# Patient Record
Sex: MALE | Race: WHITE | URBAN - METROPOLITAN AREA
[De-identification: names, ages, dates, MRNs, and addresses within clinical notes are randomized per-mention and may not be internally consistent; named-entity substitution may affect disease eponyms.]

---

## 2017-01-11 PROCEDURE — 93224 XTRNL ECG REC UP TO 48 HRS: CPT | Performed by: INTERNAL MEDICINE

## 2017-03-06 ENCOUNTER — OFFICE VISIT (OUTPATIENT)
Dept: CARDIOLOGY CLINIC | Age: 62
End: 2017-03-06

## 2017-03-06 VITALS
BODY MASS INDEX: 35.23 KG/M2 | SYSTOLIC BLOOD PRESSURE: 110 MMHG | DIASTOLIC BLOOD PRESSURE: 60 MMHG | HEART RATE: 84 BPM | WEIGHT: 274.4 LBS

## 2017-03-06 DIAGNOSIS — E66.9 OBESITY (BMI 30.0-34.9): ICD-10-CM

## 2017-03-06 DIAGNOSIS — F17.210 SMOKING GREATER THAN 30 PACK YEARS: ICD-10-CM

## 2017-03-06 DIAGNOSIS — I10 ESSENTIAL HYPERTENSION: Primary | ICD-10-CM

## 2017-03-06 DIAGNOSIS — E11.628: ICD-10-CM

## 2017-03-06 DIAGNOSIS — E78.00 PURE HYPERCHOLESTEROLEMIA: ICD-10-CM

## 2017-03-06 PROCEDURE — 99214 OFFICE O/P EST MOD 30 MIN: CPT | Performed by: NURSE PRACTITIONER

## 2017-09-11 ENCOUNTER — OFFICE VISIT (OUTPATIENT)
Dept: CARDIOLOGY CLINIC | Age: 62
End: 2017-09-11

## 2017-09-11 VITALS
DIASTOLIC BLOOD PRESSURE: 74 MMHG | HEART RATE: 80 BPM | BODY MASS INDEX: 32.48 KG/M2 | WEIGHT: 253 LBS | SYSTOLIC BLOOD PRESSURE: 130 MMHG

## 2017-09-11 DIAGNOSIS — F17.210 SMOKING GREATER THAN 30 PACK YEARS: ICD-10-CM

## 2017-09-11 DIAGNOSIS — E11.628: ICD-10-CM

## 2017-09-11 DIAGNOSIS — I10 ESSENTIAL HYPERTENSION: Primary | ICD-10-CM

## 2017-09-11 PROCEDURE — 99214 OFFICE O/P EST MOD 30 MIN: CPT | Performed by: NURSE PRACTITIONER

## 2018-07-31 ENCOUNTER — OFFICE VISIT (OUTPATIENT)
Dept: CARDIOLOGY CLINIC | Age: 63
End: 2018-07-31

## 2018-07-31 VITALS
HEART RATE: 74 BPM | WEIGHT: 243.8 LBS | SYSTOLIC BLOOD PRESSURE: 110 MMHG | DIASTOLIC BLOOD PRESSURE: 66 MMHG | BODY MASS INDEX: 31.3 KG/M2

## 2018-07-31 DIAGNOSIS — Z98.890 S/P CORONARY ANGIOGRAM: ICD-10-CM

## 2018-07-31 DIAGNOSIS — I10 ESSENTIAL HYPERTENSION: Primary | ICD-10-CM

## 2018-07-31 PROCEDURE — G8427 DOCREV CUR MEDS BY ELIG CLIN: HCPCS | Performed by: INTERNAL MEDICINE

## 2018-07-31 PROCEDURE — G8417 CALC BMI ABV UP PARAM F/U: HCPCS | Performed by: INTERNAL MEDICINE

## 2018-07-31 PROCEDURE — 4004F PT TOBACCO SCREEN RCVD TLK: CPT | Performed by: INTERNAL MEDICINE

## 2018-07-31 PROCEDURE — 99214 OFFICE O/P EST MOD 30 MIN: CPT | Performed by: INTERNAL MEDICINE

## 2018-07-31 PROCEDURE — 3017F COLORECTAL CA SCREEN DOC REV: CPT | Performed by: INTERNAL MEDICINE
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
## 2019-06-03 ENCOUNTER — OFFICE VISIT (OUTPATIENT)
Dept: CARDIOLOGY CLINIC | Age: 64
End: 2019-06-03
Payer: MEDICAID

## 2019-06-03 VITALS
SYSTOLIC BLOOD PRESSURE: 145 MMHG | HEART RATE: 72 BPM | BODY MASS INDEX: 33.48 KG/M2 | WEIGHT: 260.8 LBS | DIASTOLIC BLOOD PRESSURE: 80 MMHG

## 2019-06-03 DIAGNOSIS — I10 ESSENTIAL HYPERTENSION: Primary | ICD-10-CM

## 2019-06-03 DIAGNOSIS — I10 ESSENTIAL HYPERTENSION: ICD-10-CM

## 2019-06-03 DIAGNOSIS — E78.5 HYPERLIPIDEMIA, UNSPECIFIED HYPERLIPIDEMIA TYPE: ICD-10-CM

## 2019-06-03 DIAGNOSIS — R07.9 CHEST PAIN, UNSPECIFIED TYPE: ICD-10-CM

## 2019-06-03 DIAGNOSIS — R07.9 CHEST PAIN, UNSPECIFIED TYPE: Primary | ICD-10-CM

## 2019-06-03 DIAGNOSIS — R06.02 SOB (SHORTNESS OF BREATH): ICD-10-CM

## 2019-06-03 LAB
A/G RATIO: 1.9 (ref 1.1–2.2)
ALBUMIN SERPL-MCNC: 4.5 G/DL (ref 3.4–5)
ALP BLD-CCNC: 81 U/L (ref 40–129)
ALT SERPL-CCNC: 13 U/L (ref 10–40)
ANION GAP SERPL CALCULATED.3IONS-SCNC: 14 MMOL/L (ref 3–16)
AST SERPL-CCNC: 12 U/L (ref 15–37)
BILIRUB SERPL-MCNC: 0.3 MG/DL (ref 0–1)
BUN BLDV-MCNC: 16 MG/DL (ref 7–20)
CALCIUM SERPL-MCNC: 9.2 MG/DL (ref 8.3–10.6)
CHLORIDE BLD-SCNC: 109 MMOL/L (ref 99–110)
CHOLESTEROL, TOTAL: 101 MG/DL (ref 0–199)
CO2: 23 MMOL/L (ref 21–32)
CREAT SERPL-MCNC: 0.8 MG/DL (ref 0.8–1.3)
GFR AFRICAN AMERICAN: >60
GFR NON-AFRICAN AMERICAN: >60
GLOBULIN: 2.4 G/DL
GLUCOSE BLD-MCNC: 92 MG/DL (ref 70–99)
HCT VFR BLD CALC: 40.7 % (ref 40.5–52.5)
HDLC SERPL-MCNC: 29 MG/DL (ref 40–60)
HEMOGLOBIN: 13.5 G/DL (ref 13.5–17.5)
LDL CHOLESTEROL CALCULATED: 34 MG/DL
MCH RBC QN AUTO: 30.3 PG (ref 26–34)
MCHC RBC AUTO-ENTMCNC: 33.2 G/DL (ref 31–36)
MCV RBC AUTO: 91.3 FL (ref 80–100)
PDW BLD-RTO: 15.2 % (ref 12.4–15.4)
PLATELET # BLD: 213 K/UL (ref 135–450)
PMV BLD AUTO: 9.2 FL (ref 5–10.5)
POTASSIUM SERPL-SCNC: 4.3 MMOL/L (ref 3.5–5.1)
RBC # BLD: 4.46 M/UL (ref 4.2–5.9)
SODIUM BLD-SCNC: 146 MMOL/L (ref 136–145)
TOTAL PROTEIN: 6.9 G/DL (ref 6.4–8.2)
TRIGL SERPL-MCNC: 190 MG/DL (ref 0–150)
VLDLC SERPL CALC-MCNC: 38 MG/DL
WBC # BLD: 11.2 K/UL (ref 4–11)

## 2019-06-03 PROCEDURE — G8427 DOCREV CUR MEDS BY ELIG CLIN: HCPCS | Performed by: NURSE PRACTITIONER

## 2019-06-03 PROCEDURE — G8417 CALC BMI ABV UP PARAM F/U: HCPCS | Performed by: NURSE PRACTITIONER

## 2019-06-03 PROCEDURE — 4004F PT TOBACCO SCREEN RCVD TLK: CPT | Performed by: NURSE PRACTITIONER

## 2019-06-03 PROCEDURE — 99214 OFFICE O/P EST MOD 30 MIN: CPT | Performed by: NURSE PRACTITIONER

## 2019-06-03 PROCEDURE — 3017F COLORECTAL CA SCREEN DOC REV: CPT | Performed by: NURSE PRACTITIONER

## 2019-06-03 PROCEDURE — 93000 ELECTROCARDIOGRAM COMPLETE: CPT | Performed by: NURSE PRACTITIONER

## 2019-06-03 RX ORDER — FUROSEMIDE 40 MG/1
40 TABLET ORAL DAILY
Qty: 30 TABLET | Refills: 5 | Status: SHIPPED | OUTPATIENT
Start: 2019-06-03 | End: 2021-09-15
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
## 2019-06-14 ENCOUNTER — HOSPITAL ENCOUNTER (OUTPATIENT)
Dept: NON INVASIVE DIAGNOSTICS | Age: 64
Discharge: HOME | End: 2019-06-14
Payer: MEDICAID

## 2019-06-14 LAB
LV EF: 60 %
LV EF: 61 %
LVEF MODALITY: NORMAL
LVEF MODALITY: NORMAL

## 2019-06-14 PROCEDURE — 3430000000 HC RX DIAGNOSTIC RADIOPHARMACEUTICAL: Performed by: NURSE PRACTITIONER

## 2019-06-14 PROCEDURE — 93306 TTE W/DOPPLER COMPLETE: CPT

## 2019-06-14 PROCEDURE — 78452 HT MUSCLE IMAGE SPECT MULT: CPT

## 2019-06-14 PROCEDURE — A9502 TC99M TETROFOSMIN: HCPCS | Performed by: NURSE PRACTITIONER

## 2019-06-14 PROCEDURE — 93017 CV STRESS TEST TRACING ONLY: CPT

## 2019-06-14 RX ADMIN — TETROFOSMIN 10 MILLICURIE: 1.38 INJECTION, POWDER, LYOPHILIZED, FOR SOLUTION INTRAVENOUS at 11:07

## 2019-06-14 RX ADMIN — TETROFOSMIN 30 MILLICURIE: 1.38 INJECTION, POWDER, LYOPHILIZED, FOR SOLUTION INTRAVENOUS at 12:21

## 2019-06-26 ENCOUNTER — OFFICE VISIT (OUTPATIENT)
Dept: CARDIOLOGY CLINIC | Age: 64
End: 2019-06-26
Payer: MEDICAID

## 2019-06-26 VITALS
SYSTOLIC BLOOD PRESSURE: 107 MMHG | WEIGHT: 259.6 LBS | DIASTOLIC BLOOD PRESSURE: 60 MMHG | HEART RATE: 64 BPM | BODY MASS INDEX: 33.33 KG/M2

## 2019-06-26 DIAGNOSIS — I25.10 CORONARY ARTERY DISEASE INVOLVING NATIVE CORONARY ARTERY OF NATIVE HEART WITHOUT ANGINA PECTORIS: Primary | ICD-10-CM

## 2019-06-26 PROCEDURE — G8417 CALC BMI ABV UP PARAM F/U: HCPCS | Performed by: NURSE PRACTITIONER

## 2019-06-26 PROCEDURE — G8599 NO ASA/ANTIPLAT THER USE RNG: HCPCS | Performed by: NURSE PRACTITIONER

## 2019-06-26 PROCEDURE — G8428 CUR MEDS NOT DOCUMENT: HCPCS | Performed by: NURSE PRACTITIONER

## 2019-06-26 PROCEDURE — 3017F COLORECTAL CA SCREEN DOC REV: CPT | Performed by: NURSE PRACTITIONER

## 2019-06-26 PROCEDURE — 4004F PT TOBACCO SCREEN RCVD TLK: CPT | Performed by: NURSE PRACTITIONER

## 2019-06-26 PROCEDURE — 99214 OFFICE O/P EST MOD 30 MIN: CPT | Performed by: NURSE PRACTITIONER
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
## 2019-07-01 ENCOUNTER — TELEPHONE (OUTPATIENT)
Dept: CARDIOLOGY CLINIC | Age: 64
End: 2019-07-01

## 2019-12-16 ENCOUNTER — OFFICE VISIT (OUTPATIENT)
Dept: CARDIOLOGY CLINIC | Age: 64
End: 2019-12-16
Payer: MEDICAID

## 2019-12-16 VITALS
WEIGHT: 271 LBS | SYSTOLIC BLOOD PRESSURE: 128 MMHG | BODY MASS INDEX: 34.79 KG/M2 | HEART RATE: 76 BPM | DIASTOLIC BLOOD PRESSURE: 68 MMHG

## 2019-12-16 DIAGNOSIS — I10 ESSENTIAL HYPERTENSION: ICD-10-CM

## 2019-12-16 DIAGNOSIS — E78.00 PURE HYPERCHOLESTEROLEMIA: ICD-10-CM

## 2019-12-16 DIAGNOSIS — E66.9 OBESITY (BMI 30.0-34.9): Primary | ICD-10-CM

## 2019-12-16 PROCEDURE — G8599 NO ASA/ANTIPLAT THER USE RNG: HCPCS | Performed by: NURSE PRACTITIONER

## 2019-12-16 PROCEDURE — G8417 CALC BMI ABV UP PARAM F/U: HCPCS | Performed by: NURSE PRACTITIONER

## 2019-12-16 PROCEDURE — 4004F PT TOBACCO SCREEN RCVD TLK: CPT | Performed by: NURSE PRACTITIONER

## 2019-12-16 PROCEDURE — 99214 OFFICE O/P EST MOD 30 MIN: CPT | Performed by: NURSE PRACTITIONER

## 2019-12-16 PROCEDURE — G8428 CUR MEDS NOT DOCUMENT: HCPCS | Performed by: NURSE PRACTITIONER

## 2019-12-16 PROCEDURE — 3017F COLORECTAL CA SCREEN DOC REV: CPT | Performed by: NURSE PRACTITIONER

## 2019-12-16 PROCEDURE — G8484 FLU IMMUNIZE NO ADMIN: HCPCS | Performed by: NURSE PRACTITIONER

## 2020-06-09 ENCOUNTER — OFFICE VISIT (OUTPATIENT)
Dept: CARDIOLOGY CLINIC | Age: 65
End: 2020-06-09
Payer: MEDICAID

## 2020-06-09 VITALS
DIASTOLIC BLOOD PRESSURE: 74 MMHG | WEIGHT: 272.8 LBS | TEMPERATURE: 97.1 F | HEART RATE: 68 BPM | SYSTOLIC BLOOD PRESSURE: 130 MMHG | BODY MASS INDEX: 35.03 KG/M2

## 2020-06-09 PROCEDURE — 99214 OFFICE O/P EST MOD 30 MIN: CPT | Performed by: NURSE PRACTITIONER

## 2020-06-09 PROCEDURE — G8417 CALC BMI ABV UP PARAM F/U: HCPCS | Performed by: NURSE PRACTITIONER

## 2020-06-09 PROCEDURE — 3017F COLORECTAL CA SCREEN DOC REV: CPT | Performed by: NURSE PRACTITIONER

## 2020-06-09 PROCEDURE — 4004F PT TOBACCO SCREEN RCVD TLK: CPT | Performed by: NURSE PRACTITIONER

## 2020-06-09 PROCEDURE — G8427 DOCREV CUR MEDS BY ELIG CLIN: HCPCS | Performed by: NURSE PRACTITIONER

## 2020-06-09 PROCEDURE — 3046F HEMOGLOBIN A1C LEVEL >9.0%: CPT | Performed by: NURSE PRACTITIONER

## 2020-06-09 PROCEDURE — 2022F DILAT RTA XM EVC RTNOPTHY: CPT | Performed by: NURSE PRACTITIONER

## 2020-06-09 NOTE — PATIENT INSTRUCTIONS
Today he c/o occas chest pain / no radiation no SOB wt stable VSS   No c/o today of cp/SOB  He is a very poor historian  / he is a smoker with DMT2   GXT nuc and blood work   YARI study   Fu I 3-4 weeks

## 2020-06-09 NOTE — PROGRESS NOTES
 Hypertension     Migraine      Past Surgical History:   Procedure Laterality Date    ARTHRODESIS Left 9-17-14    Arthrodesis 4 th proximal interphalangeal joint left foot    BLADDER SURGERY      EYE SURGERY      right eye-skin cancer removed    KNEE ARTHROSCOPY Right     x4     No family history on file. Social History     Tobacco Use    Smoking status: Current Every Day Smoker     Packs/day: 1.50     Years: 40.00     Pack years: 60.00     Types: Cigarettes    Smokeless tobacco: Never Used   Substance Use Topics    Alcohol use: Yes     Alcohol/week: 0.0 standard drinks     Comment: occas    Drug use: No       Allergies   Allergen Reactions    Codeine Nausea Only     Current Outpatient Medications   Medication Sig Dispense Refill    furosemide (LASIX) 40 MG tablet Take 1 tablet by mouth daily 30 tablet 5    Omega-3 Fatty Acids (FISH OIL) 1200 MG CAPS Take by mouth      hydrocortisone (ANUSOL-HC) 25 MG suppository Place 1 suppository rectally 2 times daily 10 suppository 0    methocarbamol (ROBAXIN) 750 MG tablet Take 750 mg by mouth      folic acid-pyridoxine-cyanocobalamine (FOLTX) 2.5-25-1 MG TABS tablet Take 1 tablet by mouth daily      sitaGLIPtin (JANUVIA) 100 MG tablet Take 100 mg by mouth daily      metFORMIN (GLUCOPHAGE) 1000 MG tablet Take 1,000 mg by mouth daily (with breakfast)      insulin glargine (LANTUS) 100 UNIT/ML injection vial Inject 60 Units into the skin 2 times daily.  DULoxetine (CYMBALTA) 30 MG capsule Take 30 mg by mouth daily.  carvedilol (COREG) 6.25 MG tablet Take 6.25 mg by mouth 2 times daily (with meals).  insulin aspart (NOVOLOG) 100 UNIT/ML injection vial Inject  into the skin 3 times daily (before meals). Sliding scale      naproxen (NAPROSYN) 500 MG tablet Take 500 mg by mouth 2 times daily (with meals). Instructed to stop for surgery      simvastatin (ZOCOR) 40 MG tablet Take 40 mg by mouth nightly.       lisinopril (PRINIVIL;ZESTRIL) 20 MG tablet Take 20 mg by mouth daily.  amLODIPine (NORVASC) 5 MG tablet Take 5 mg by mouth daily.  hydrALAZINE (APRESOLINE) 25 MG tablet Take 25 mg by mouth 2 times daily.  fenofibrate 160 MG tablet Take 160 mg by mouth daily.  Multiple Vitamins-Minerals (MULTIVITAMIN PO) Take 1 capsule by mouth daily. taylor-instructed to stop for surgery-       No current facility-administered medications for this visit. Physical Exam:   /74   Pulse 68   Temp 97.1 °F (36.2 °C)   Wt 272 lb 12.8 oz (123.7 kg)   BMI 35.03 kg/m²   BP Readings from Last 3 Encounters:   06/09/20 130/74   12/16/19 128/68   06/26/19 107/60     Pulse Readings from Last 3 Encounters:   06/09/20 68   12/16/19 76   06/26/19 64     Wt Readings from Last 3 Encounters:   06/09/20 272 lb 12.8 oz (123.7 kg)   12/16/19 271 lb (122.9 kg)   06/26/19 259 lb 9.6 oz (117.8 kg)     Constitutional: He is oriented to person, place, and time. He appears well-developed and well-nourished. In no acute distress. HEENT: Normocephalic and atraumatic. Sclerae anicteric. No xanthelasmas. Conjunctiva white, no subconjunctival hemorrhage   External inspection of ears nose teeth & gums   Eyes:PERRLA EOM's intact. Neck: Neck supple. No JVD present. Carotids without bruits. No mass and no thyromegaly present. No lymphadenopathy present. Cardiovascular: RRR, normal S1 and S2; no murmur/gallop or rub, PMI nondisplaced  Pulmonary/Chest: Effort normal.  Lungs clear to auscultation. Chest wall nontender  Abdominal: soft, nontender, nondistended. + bowel sounds; no organomegaly or bruits. Aorta normal  Extremities: No edema, cyanosis, or clubbing. Pulses are 2+ radial/carotid/dorsalis pedis bilaterally. Cap refill brisk. Neurological: No cranial nerve deficit. Psychiatric: He has a normal mood and affect.  His speech is normal and behavior is normal.     Lab Review:   Lab Results   Component Value Date    TRIG 190 06/03/2019    HDL 29 06/03/2019 LDLCALC 34 2019    LABVLDL 38 2019     Lab Results   Component Value Date     2019    K 4.3 2019     2019    CO2 23 2019    BUN 16 2019    CREATININE 0.8 2019    GLUCOSE 92 2019    CALCIUM 9.2 2019      Lab Results   Component Value Date    WBC 11.2 (H) 2019    HGB 13.5 2019    HCT 40.7 2019    MCV 91.3 2019     2019     I have reviewed any available labs, images, any stress test, C on this pt         Assessment:    HTN  2019 echo wnl   2019 neg stress test    2014 [facility] No evidence of hemodynamically significant coronary artery disease  Discussed smoking cessation    HFpEF   On lasix 40mg daily with HFpEF /wt stable no c/o PND/YARI   States eats lots of sodium / discussed     HLD  LDL 34 / optimal     DMT2  Glucose level wnl      tobacco use  cessation disucssed  States smokes  3ppd for 17 years     palpitations    denies any at [facility] time    YARI?   Recommend study    Anxiety   Talks about his  family members each visit over and over  /  they  in the    Recommend psychiatric consult   denies suicidal thoughts     Plan   Today he c/o occas chest pain / no radiation no SOB wt stable VSS   No c/o today of cp/SOB  He is a very poor historian  / he is a smoker with DMT2   GXT nuc and blood work   YARI study   Fu in 3-4 weeks       Elsa AMAYA,CVNP

## 2020-06-30 ENCOUNTER — OFFICE VISIT (OUTPATIENT)
Dept: CARDIOLOGY CLINIC | Age: 65
End: 2020-06-30
Payer: MEDICAID

## 2020-06-30 VITALS
DIASTOLIC BLOOD PRESSURE: 80 MMHG | WEIGHT: 273 LBS | BODY MASS INDEX: 35.05 KG/M2 | HEART RATE: 60 BPM | TEMPERATURE: 97.3 F | SYSTOLIC BLOOD PRESSURE: 120 MMHG

## 2020-06-30 PROCEDURE — 3017F COLORECTAL CA SCREEN DOC REV: CPT | Performed by: NURSE PRACTITIONER

## 2020-06-30 PROCEDURE — G8417 CALC BMI ABV UP PARAM F/U: HCPCS | Performed by: NURSE PRACTITIONER

## 2020-06-30 PROCEDURE — G8428 CUR MEDS NOT DOCUMENT: HCPCS | Performed by: NURSE PRACTITIONER

## 2020-06-30 PROCEDURE — 99214 OFFICE O/P EST MOD 30 MIN: CPT | Performed by: NURSE PRACTITIONER

## 2020-06-30 PROCEDURE — 4004F PT TOBACCO SCREEN RCVD TLK: CPT | Performed by: NURSE PRACTITIONER

## 2020-06-30 NOTE — PROGRESS NOTES
[employer]  Office Visit           Hai Velasquez MD,  [address] FNP CVNP       Cardiology             Deloris Mcconnell  1955 June 30, 2020    Primary Cardiologist:  Zahida Quinones     CC: HTN HLD DMT2/tobacco use  / palpitation     Interval Hx: Mr. Eula Camara his here with HTN stable  Tobacco use discussed cessation 3ppd for 17 years   2019 echo wnl /  6/2019 neg stress test    2014 [facility] No evidence of hemodynamically significant coronary artery disease  HFpEF on lasix wt stable / appears compensated   Here with is significant other/ disscusse dhis family sister and mothers death occurred years ago and he states he talks in his sleep abut his dead family members /  We discussed psychologist help if has this long term issues / dreams / harm self? ?  / was  in [facility] in patient & sign other     He did not get his YARI study or stress test as recommended for atypical cp     I have examined pt and reviewed notes / any lab work EKGs stress test, angiograms, & images reviewed  I  have spent >20 minutes of face to face time with the patient with more than 50% spent counseling and coordinating care this patient. Review of Systems:  Constitutional: Denies  fatigue, weakness, night sweats or fever. HEENT: Denies new visual changes, ringing in ears, nosebleeds,nasal congestion  Respiratory: Denies new or change in SOB, PND, orthopnea or cough. Cardiovascular: see HPI  GI: Denies N/V, diarrhea, constipation, abdominal pain, change in bowel habits, melena or hematochezia  : Denies urinary frequency, urgency, incontinence, hematuria or dysuria. Skin: Denies rash, hives, or cyanosis  Musculoskeletal: Denies joint or muscle aches/pain  Neurological: Denies syncope or TIA-like symptoms.   Psychiatric: Denies anxiety, insomnia or depression     Past Medical History:   Diagnosis Date    Anxiety     Arthritis     CAD (coronary artery disease)     Cancer ([facility] [address] naproxen (NAPROSYN) 500 MG tablet Take 500 mg by mouth 2 times daily (with meals). Instructed to stop for surgery      simvastatin (ZOCOR) 40 MG tablet Take 40 mg by mouth nightly.  lisinopril (PRINIVIL;ZESTRIL) 20 MG tablet Take 20 mg by mouth daily.  amLODIPine (NORVASC) 5 MG tablet Take 5 mg by mouth daily.  hydrALAZINE (APRESOLINE) 25 MG tablet Take 25 mg by mouth 2 times daily.  fenofibrate 160 MG tablet Take 160 mg by mouth daily.  Multiple Vitamins-Minerals (MULTIVITAMIN PO) Take 1 capsule by mouth daily. taylor-instructed to stop for surgery-       No current facility-administered medications for this visit. Physical Exam:   /80   Pulse 60   Temp 97.3 °F (36.3 °C)   Wt 273 lb (123.8 kg)   BMI 35.05 kg/m²   BP Readings from Last 3 Encounters:   06/30/20 120/80   06/09/20 130/74   12/16/19 128/68     Pulse Readings from Last 3 Encounters:   06/30/20 60   06/09/20 68   12/16/19 76     Wt Readings from Last 3 Encounters:   06/30/20 273 lb (123.8 kg)   06/09/20 272 lb 12.8 oz (123.7 kg)   12/16/19 271 lb (122.9 kg)     Constitutional: He is oriented to person, place, and time. He appears well-developed and well-nourished. In no acute distress. HEENT: Normocephalic and atraumatic. Sclerae anicteric. No xanthelasmas. Conjunctiva white, no subconjunctival hemorrhage   External inspection of ears nose teeth & gums   Eyes:PERRLA EOM's intact. Neck: Neck supple. No JVD present. Carotids without bruits. No mass and no thyromegaly present. No lymphadenopathy present. Cardiovascular: RRR, normal S1 and S2; no murmur/gallop or rub, PMI nondisplaced  Pulmonary/Chest: Effort normal.  Lungs clear to auscultation. Chest wall nontender  Abdominal: soft, nontender, nondistended. + bowel sounds; no organomegaly or bruits. Aorta normal  Extremities: No edema, cyanosis, or clubbing. Pulses are 2+ radial/carotid/dorsalis pedis bilaterally. Cap refill brisk.   Neurological: No cranial nerve deficit. Psychiatric: He has a normal mood and affect. His speech is normal and behavior is normal.     Lab Review:   Lab Results   Component Value Date    TRIG 190 06/03/2019    HDL 29 06/03/2019    LDLCALC 34 06/03/2019    LABVLDL 38 06/03/2019     Lab Results   Component Value Date     06/03/2019    K 4.3 06/03/2019     06/03/2019    CO2 23 06/03/2019    BUN 16 06/03/2019    CREATININE 0.8 06/03/2019    GLUCOSE 92 06/03/2019    CALCIUM 9.2 06/03/2019      Lab Results   Component Value Date    WBC 11.2 (H) 06/03/2019    HGB 13.5 06/03/2019    HCT 40.7 06/03/2019    MCV 91.3 06/03/2019     06/03/2019       I have reviewed any available labs, images, any stress test, [facility] on this pt         Assessment:    HTN   Optimal  2019 echo wnl /  6/2019 neg stress test    2014 [facility] No evidence of hemodynamically significant coronary artery disease  HFpEF on lasix wt stable / appears compensated     HLD   optimal     DMT2  controlled   Pr IM managed     tobacco use   Discussed cessation     Palpitation  Stable    Tobacco use   discussed cessation 3ppd for 17 years     Plan:   Here with is significant other/ disscusse dhis family sister and mothers death occurred years ago and he states he talks in his sleep abut his dead family members /  We discussed psychologist help if has this long term issues / dreams / harm self? ?  / was  in [facility] in patient & significant  other   & PCP   He did not get his YARI study or stress test as recommended for atypical cp / discussed   Fu in 3 months sleep study blood work and Festus AMAYA,CVNP

## 2021-09-14 ENCOUNTER — APPOINTMENT (OUTPATIENT)
Dept: GENERAL RADIOLOGY | Age: 66
DRG: 420 | End: 2021-09-14
Payer: MEDICAID

## 2021-09-14 ENCOUNTER — HOSPITAL ENCOUNTER (EMERGENCY)
Age: 66
Discharge: HOME | DRG: 420 | End: 2021-09-14
Attending: EMERGENCY MEDICINE
Payer: MEDICAID

## 2021-09-14 ENCOUNTER — APPOINTMENT (OUTPATIENT)
Dept: CT IMAGING | Age: 66
DRG: 420 | End: 2021-09-14
Payer: MEDICAID

## 2021-09-14 VITALS
DIASTOLIC BLOOD PRESSURE: 75 MMHG | RESPIRATION RATE: 15 BRPM | BODY MASS INDEX: 33.95 KG/M2 | WEIGHT: 264.55 LBS | OXYGEN SATURATION: 98 % | HEART RATE: 65 BPM | HEIGHT: 74 IN | SYSTOLIC BLOOD PRESSURE: 146 MMHG | TEMPERATURE: 98.1 F

## 2021-09-14 DIAGNOSIS — E16.2 HYPOGLYCEMIA: Primary | ICD-10-CM

## 2021-09-14 LAB
A/G RATIO: 1.5 (ref 1.1–2.2)
ALBUMIN SERPL-MCNC: 4.4 G/DL (ref 3.4–5)
ALP BLD-CCNC: 80 U/L (ref 40–129)
ALT SERPL-CCNC: 17 U/L (ref 10–40)
ANION GAP SERPL CALCULATED.3IONS-SCNC: 16 MMOL/L (ref 3–16)
AST SERPL-CCNC: 19 U/L (ref 15–37)
BASOPHILS ABSOLUTE: 0.1 K/UL (ref 0–0.2)
BASOPHILS RELATIVE PERCENT: 0.8 %
BILIRUB SERPL-MCNC: 0.4 MG/DL (ref 0–1)
BILIRUBIN URINE: NEGATIVE
BLOOD, URINE: NEGATIVE
BUN BLDV-MCNC: 17 MG/DL (ref 7–20)
CALCIUM SERPL-MCNC: 9.5 MG/DL (ref 8.3–10.6)
CHLORIDE BLD-SCNC: 106 MMOL/L (ref 99–110)
CLARITY: CLEAR
CO2: 21 MMOL/L (ref 21–32)
COLOR: YELLOW
CREAT SERPL-MCNC: 0.8 MG/DL (ref 0.8–1.3)
EKG ATRIAL RATE: 67 BPM
EKG DIAGNOSIS: NORMAL
EKG P AXIS: 71 DEGREES
EKG P-R INTERVAL: 108 MS
EKG Q-T INTERVAL: 396 MS
EKG QRS DURATION: 86 MS
EKG QTC CALCULATION (BAZETT): 418 MS
EKG R AXIS: -41 DEGREES
EKG T AXIS: 246 DEGREES
EKG VENTRICULAR RATE: 67 BPM
EOSINOPHILS ABSOLUTE: 0.3 K/UL (ref 0–0.6)
EOSINOPHILS RELATIVE PERCENT: 2.6 %
GFR AFRICAN AMERICAN: >60
GFR NON-AFRICAN AMERICAN: >60
GLOBULIN: 2.9 G/DL
GLUCOSE BLD-MCNC: 117 MG/DL (ref 70–99)
GLUCOSE BLD-MCNC: 121 MG/DL (ref 70–99)
GLUCOSE BLD-MCNC: 45 MG/DL (ref 70–99)
GLUCOSE BLD-MCNC: 50 MG/DL (ref 70–99)
GLUCOSE URINE: NEGATIVE MG/DL
HCT VFR BLD CALC: 44 % (ref 40.5–52.5)
HEMOGLOBIN: 14.4 G/DL (ref 13.5–17.5)
KETONES, URINE: NEGATIVE MG/DL
LEUKOCYTE ESTERASE, URINE: NEGATIVE
LYMPHOCYTES ABSOLUTE: 2.5 K/UL (ref 1–5.1)
LYMPHOCYTES RELATIVE PERCENT: 20.3 %
MCH RBC QN AUTO: 29.5 PG (ref 26–34)
MCHC RBC AUTO-ENTMCNC: 32.7 G/DL (ref 31–36)
MCV RBC AUTO: 90.4 FL (ref 80–100)
MICROSCOPIC EXAMINATION: NORMAL
MONOCYTES ABSOLUTE: 0.9 K/UL (ref 0–1.3)
MONOCYTES RELATIVE PERCENT: 6.9 %
NEUTROPHILS ABSOLUTE: 8.6 K/UL (ref 1.7–7.7)
NEUTROPHILS RELATIVE PERCENT: 69.4 %
NITRITE, URINE: NEGATIVE
PDW BLD-RTO: 15 % (ref 12.4–15.4)
PERFORMED ON: ABNORMAL
PH UA: 7 (ref 5–8)
PLATELET # BLD: 241 K/UL (ref 135–450)
PMV BLD AUTO: 9 FL (ref 5–10.5)
POTASSIUM REFLEX MAGNESIUM: 3.7 MMOL/L (ref 3.5–5.1)
PROTEIN UA: NEGATIVE MG/DL
RBC # BLD: 4.87 M/UL (ref 4.2–5.9)
SODIUM BLD-SCNC: 143 MMOL/L (ref 136–145)
SPECIFIC GRAVITY UA: 1.01 (ref 1–1.03)
TOTAL PROTEIN: 7.3 G/DL (ref 6.4–8.2)
TROPONIN: <0.01 NG/ML
URINE REFLEX TO CULTURE: NORMAL
URINE TYPE: NORMAL
UROBILINOGEN, URINE: 1 E.U./DL
WBC # BLD: 12.4 K/UL (ref 4–11)

## 2021-09-14 PROCEDURE — 81003 URINALYSIS AUTO W/O SCOPE: CPT

## 2021-09-14 PROCEDURE — 93005 ELECTROCARDIOGRAM TRACING: CPT | Performed by: PHYSICIAN ASSISTANT

## 2021-09-14 PROCEDURE — 93010 ELECTROCARDIOGRAM REPORT: CPT | Performed by: INTERNAL MEDICINE

## 2021-09-14 PROCEDURE — 2500000003 HC RX 250 WO HCPCS: Performed by: PHYSICIAN ASSISTANT

## 2021-09-14 PROCEDURE — 99284 EMERGENCY DEPT VISIT MOD MDM: CPT

## 2021-09-14 PROCEDURE — 72125 CT NECK SPINE W/O DYE: CPT

## 2021-09-14 PROCEDURE — 84484 ASSAY OF TROPONIN QUANT: CPT

## 2021-09-14 PROCEDURE — 80053 COMPREHEN METABOLIC PANEL: CPT

## 2021-09-14 PROCEDURE — 71045 X-RAY EXAM CHEST 1 VIEW: CPT

## 2021-09-14 PROCEDURE — 70450 CT HEAD/BRAIN W/O DYE: CPT

## 2021-09-14 PROCEDURE — 85025 COMPLETE CBC W/AUTO DIFF WBC: CPT

## 2021-09-14 RX ORDER — DEXTROSE MONOHYDRATE 25 G/50ML
25 INJECTION, SOLUTION INTRAVENOUS ONCE
Status: COMPLETED | OUTPATIENT
Start: 2021-09-14 | End: 2021-09-14

## 2021-09-14 RX ADMIN — DEXTROSE MONOHYDRATE 25 G: 25 INJECTION, SOLUTION INTRAVENOUS at 10:44

## 2021-09-14 ASSESSMENT — PAIN SCALES - GENERAL
PAINLEVEL_OUTOF10: 0

## 2021-09-14 NOTE — ED NOTES
Pt to ED via [facility]. Per EMS report pt fell out of bed and was stuck between the bed and nightstand. Per EMS report, pt bs 38. EMS gave pt 15g of oral glucose pta. Pt has hx of dementia.       Ruben Smyth, RN  09/14/21 2207

## 2021-09-14 NOTE — ED PROVIDER NOTES
[facility]  eMERGENCY dEPARTMENT eNCOUnter   Physician Attestation    Pt Name: Juliet Steward  MRN: [id number]  Pao 1955  Date of evaluation: 9/14/21        Physician Note:    I havepersonally performed and/or participated in the history, exam and medical decision making and agree with all pertinent clinical information. I have also reviewed and agree with the past medical, family and social historyunless otherwise noted. I have personally performed a face to face diagnostic evaluation onthis patient. I have reviewed the mid-levels findings and agree. History: This is a 62-year-old male with a history of dementia and diabetes. With his girlfriend. However, he self administers his insulin. He was brought in with a hypoglycemic event that was resolved with IV glucose here. Upon talking to this patient it is not clear that he is all that capable of administering his own insulin. He does have some type of kit and set up at home but we asked him to describe it all he states is that it is noon and he needs to go home. He came in acutely ill-appearing diaphoretic with altered mental status but is now much more alert warm and dry and nontoxic-appearing    Physical Exam  Vitals and nursing note reviewed. Constitutional:       Appearance: He is well-developed. He is not diaphoretic. HENT:      Head: Normocephalic and atraumatic. Right Ear: External ear normal.      Left Ear: External ear normal.   Eyes:      General: No scleral icterus. Right eye: No discharge. Left eye: No discharge. Conjunctiva/sclera: Conjunctivae normal.   Neck:      Trachea: No tracheal deviation. Pulmonary:      Effort: Pulmonary effort is normal. No respiratory distress. Breath sounds: No stridor. Musculoskeletal:         General: Normal range of motion. Cervical back: Normal range of motion. Skin:     General: Skin is warm and dry.    Neurological: General: No focal deficit present. Mental Status: He is alert. Coordination: Coordination normal.      Comments: Currently he is alert looks nontoxic but arrived confused diaphoretic. Psychiatric:         Behavior: Behavior normal.      Comments: Again he has normal behavior now but arrives quite ill-appearing. Apparently his blood sugar was 30 in the field. EKG visualized preliminarily interpreted by myself shows sinus rhythm at a rate of 67. Diffuse T wave flattening. Electrical interference affects limb leads. Left axis deviation of -41. Probable old septal wall infarct but this could be due to lead placement. No acute ST elevation. Intervals are normal.    We are arranging for home care as well as the diabetic educator spending time with the patient and the girlfriend. The patient does not live alone.     1. Hypoglycemia          DISPOSITION/PLAN  PATIENT REFERRED TO:  Julia Combs MD  [address] [place] [address],[address] [facility] [address] [facility]  [phone]    Schedule an appointment as soon as possible for a visit       [facility] Emergency Department  [address] [facility]  [phone]    If symptoms worsen    DISCHARGE MEDICATIONS:  New Prescriptions    No medications on file         MD Earnestine Mayfield MD  09/14/21 9409

## 2021-09-14 NOTE — DISCHARGE INSTR - COC
Continuity of Care Form    Patient Name: Elba Barfield   :  1955  MRN:  [id number]    Admit date:  2021  Discharge date:  2021    Code Status Order: Prior   Advance Directives:     Admitting Physician:  No admitting provider for patient encounter. PCP: Gabo Marques MD    Discharging Nurse: Juan Leggett Unit/Room#: 034/B-34  Discharging Unit Phone Number: ***    Emergency Contact:   Extended Emergency Contact Information  Primary Emergency Contact: Maddy Nikkyjuskory  Address: [address] [place] [address]           [address] Phone: [phone]  Mobile Phone: [phone]  Relation: Other    Past Surgical History:  Past Surgical History:   Procedure Laterality Date    ARTHRODESIS Left 14    Arthrodesis 4 th proximal interphalangeal joint left foot    BLADDER SURGERY      EYE SURGERY      right eye-skin cancer removed    KNEE ARTHROSCOPY Right     x4       Immunization History:   Immunization History   Administered Date(s) Administered    COVID-19, Pfizer, PF, 30mcg/0.3mL 2021, 2021       Active Problems:  Patient Active Problem List   Diagnosis Code    Curry M20.40    Diabetes mellitus type 2, controlled ([facility] [address] E11.9    Hypertension I10    Smoking greater than 30 pack years F17.210    Obesity (BMI 30.0-34. 9) E66.9    Atopic rhinitis J30.9    Benign neoplasm of colon D12.6    Benign neoplasm of skin D23.9    Tuberculosis of unspecified bones and joints, confirmation unspecified [id number]    Dermatitis factitia L98.1    Gastroesophageal reflux disease K21.9    Essential hypertension I10    Lipoma of face D17.0    Migraine G43.909    Pain in extremity M79.609    Pruritic rash L28.2    Pure hypercholesterolemia E78.00    Diastasis recti M62.08       Isolation/Infection:   Isolation          No Isolation        Patient Infection Status     None to display          Nurse Assessment:  Last Vital Signs: BP (!) 161/86   Pulse 68   Temp 98.4 °F (36.9 °C) (Oral)   Resp 17   Ht 6' 2\" (1.88 m)   Wt 264 lb 8.8 oz (120 kg)   SpO2 99%   BMI 33.97 kg/m²     Last documented pain score (0-10 scale): Pain Level: 0  Last Weight:   Wt Readings from Last 1 Encounters:   09/14/21 264 lb 8.8 oz (120 kg)     Mental Status:  disoriented and alert    IV Access:  - None    Nursing Mobility/ADLs:  Walking   Assisted  Transfer  Assisted  Bathing  Assisted  Dressing  Assisted  Toileting  Assisted  Feeding  Independent  Med Admin  Assisted  Med Delivery   whole    Wound Care Documentation and Therapy:  Incision 09/17/14 Foot Left (Active)   Number of days: 1629        Elimination:  Continence:   · Bowel: Yes  · Bladder: Yes  Urinary Catheter: None   Colostomy/Ileostomy/Ileal Conduit: No       Date of Last BM: 9/11/2021    Intake/Output Summary (Last 24 hours) at 9/14/2021 1313  Last data filed at 9/14/2021 1105  Gross per 24 hour   Intake --   Output 1000 ml   Net -1000 ml     No intake/output data recorded. Safety Concerns:     History of Falls (last 30 days)    Impairments/Disabilities:      None    Nutrition Therapy:  Current Nutrition Therapy:   - Oral Diet:  General    Routes of Feeding: Oral  Liquids: Thin Liquids  Daily Fluid Restriction: no  Last Modified Barium Swallow with Video (Video Swallowing Test): not done    Treatments at the Time of Hospital Discharge:   Respiratory Treatments:   Oxygen Therapy:  is not on home oxygen therapy. Ventilator:    - No ventilator support    Rehab Therapies: Nurse  Weight Bearing Status/Restrictions: No weight bearing restirctions  Other Medical Equipment (for information only, NOT a DME order):  walker  Other Treatments: DM education.      Patient's personal belongings (please select all that are sent with patient):  None    RN SIGNATURE:  Electronically signed by Tanmay Thurman RN on 9/14/21 at 1:21 PM EDT    CASE MANAGEMENT/SOCIAL WORK SECTION    Inpatient Status Date: N/A    Readmission Risk Assessment Score:  Readmission Risk              Risk of Unplanned Readmission:  0           Discharging to Facility/ Agency   Name:  [facility] care    Address: [address] [place] [address]., [address] [place] [address]  Phone: [phone]  Fax: [phone]    / signature: Electronically signed by LEVON Covarrubias on 9/14/21 at 3:13 PM EDT    PHYSICIAN SECTION    Prognosis: Good    Condition at Discharge: Stable    Rehab Potential (if transferring to Rehab): Good    Recommended Labs or Other Treatments After Discharge: Diabetic Education    Physician Certification: I certify the above information and transfer of Brooke Ahr  is necessary for the continuing treatment of the diagnosis listed and that he requires Home Care for greater 30 days.      Update Admission H&P: No change in H&P    PHYSICIAN SIGNATURE: Rios Hall MD

## 2021-09-14 NOTE — ED NOTES
Bed: [facility]  Expected date:   Expected time:   Means of arrival: [facility]  Comments:  GHANSHYAM Pereyra RN  09/14/21 1035

## 2021-09-14 NOTE — CARE COORDINATION
TOSHIA Consult:  Diabetic needing assistance. Patient has alzheimer's disease and has been self administering his own insulin and overdosed on insulin today. TOSHIA contacted Diabetic Educator - Francisco Limon and she will respond to the ED to provide education.

## 2021-09-14 NOTE — ED NOTES
Pt much more alert, repeat poc blood glucose 121. Pt straight cathed for urine as ordered by provided with 1000 ml's of urine out. JEREMIAS Rojas made aware.       Dasha Robert RN  09/14/21 1642

## 2021-09-14 NOTE — ED PROVIDER NOTES
[address] [facility]      Pt Name: Rohini Underwood  MRN: [id number]  [place] 1955  Date of evaluation: 9/14/2021  Provider: Etelvina Mace PA-C    This patient was seen and evaluated by the attending physician Dr. Tomi Pena      Chief Complaint: [address] time was 15 minutes, excluding separately reportable procedures. There was a high probability of clinically significant/life threatening deterioration in the patient's condition which required my urgent intervention. HISTORYOF PRESENT ILLNESS  (Location/Symptom, Timing/Onset, Context/Setting, Quality, Duration, Modifying Factors, Severity.)   Rohini Underwood is a 61 y.o. male with a history of diabetes and dementia who presents to the emergency department with altered mental status. The patient is minimally responsive on arrival.  His eyes are closed. He opens him to verbal stimuli but he is having trouble speaking. According to paramedics, they were called to the home for a \"fall\" but when they arrived the patient was minimally responsive. His girlfriend had just checked his sugar and found it to be in the 30s. They gave him oral glucose. They state that he is more responsive now than when they first arrived on scene. Given his profound altered mental status, the history is quite limited. Nursing Notes were reviewed and I agree. REVIEW OF SYSTEMS    (2-9 systems for level 4, 10 or more forlevel 5)     Review of Systems   Unable to perform ROS: Mental status change       Positives and Pertinent negatives as per HPI. Except as noted above the remainder of the review of systems was reviewed and negative.        PAST MEDICALHISTORY         Diagnosis Date    Anxiety     Arthritis     CAD (coronary artery disease)     Cancer ([address]     skin-legs,arms,neck-basal cell eye    Depression     Dermatitis     Diabetes mellitus ([address]     Diabetic neuropathy (HCC)     GERD (gastroesophageal reflux disease)     Giardia     Hemorrhoids     Hyperlipidemia     Hypertension     Migraine        SURGICAL HISTORY           Procedure Laterality Date    ARTHRODESIS Left 9-17-14    Arthrodesis 4 th proximal interphalangeal joint left foot    BLADDER SURGERY      EYE SURGERY      right eye-skin cancer removed    KNEE ARTHROSCOPY Right     x4       CURRENT MEDICATIONS       Discharge Medication List as of 9/14/2021  1:31 PM      CONTINUE these medications which have NOT CHANGED    Details   furosemide (LASIX) 40 MG tablet Take 1 tablet by mouth daily, Disp-30 tablet, R-5Normal      Omega-3 Fatty Acids (FISH OIL) 1200 MG CAPS Take by mouth      hydrocortisone (ANUSOL-HC) 25 MG suppository Place 1 suppository rectally 2 times daily, Disp-10 suppository, R-0      methocarbamol (ROBAXIN) 750 MG tablet Take 750 mg by mouth      folic acid-pyridoxine-cyanocobalamine (FOLTX) 2.5-25-1 MG TABS tablet Take 1 tablet by mouth daily      sitaGLIPtin (JANUVIA) 100 MG tablet Take 100 mg by mouth daily      metFORMIN (GLUCOPHAGE) 1000 MG tablet Take 1,000 mg by mouth daily (with breakfast)      insulin glargine (LANTUS) 100 UNIT/ML injection vial Inject 60 Units into the skin 2 times daily. DULoxetine (CYMBALTA) 30 MG capsule Take 30 mg by mouth daily. carvedilol (COREG) 6.25 MG tablet Take 6.25 mg by mouth 2 times daily (with meals). insulin aspart (NOVOLOG) 100 UNIT/ML injection vial Inject  into the skin 3 times daily (before meals). Sliding scale      naproxen (NAPROSYN) 500 MG tablet Take 500 mg by mouth 2 times daily (with meals). Instructed to stop for surgery      simvastatin (ZOCOR) 40 MG tablet Take 40 mg by mouth nightly. lisinopril (PRINIVIL;ZESTRIL) 20 MG tablet Take 20 mg by mouth daily. amLODIPine (NORVASC) 5 MG tablet Take 5 mg by mouth daily.       hydrALAZINE (APRESOLINE) 25 MG tablet Take 25 mg by mouth 2 times daily. fenofibrate 160 MG tablet Take 160 mg by mouth daily. Multiple Vitamins-Minerals (MULTIVITAMIN PO) Take 1 capsule by mouth daily. taylor-instructed to stop for surgery-             ALLERGIES     Codeine    FAMILY HISTORY     History reviewed. No pertinent family history. No family status information on file. SOCIAL HISTORY    reports that he has been smoking cigarettes. He has a 60.00 pack-year smoking history. He has never used smokeless tobacco. He reports current alcohol use. He reports that he does not use drugs. PHYSICAL EXAM    (up to 7 for level 4, 8 or more for level 5)     ED Triage Vitals [09/14/21 1048]   BP Temp Temp Source Pulse Resp SpO2 Height Weight   (!) 158/78 98.4 °F (36.9 °C) Oral 66 18 97 % 6' 2\" (1.88 m) 264 lb 8.8 oz (120 kg)       Physical Exam  Vitals and nursing note reviewed. Constitutional:       Appearance: He is well-developed. He is ill-appearing. HENT:      Head: Normocephalic and atraumatic. Cardiovascular:      Rate and Rhythm: Normal rate and regular rhythm. Heart sounds: Normal heart sounds. Pulmonary:      Effort: Pulmonary effort is normal. No respiratory distress. Breath sounds: Normal breath sounds. Abdominal:      Palpations: Abdomen is soft. Tenderness: There is no abdominal tenderness. Musculoskeletal:         General: Normal range of motion. Cervical back: Neck supple. Skin:     General: Skin is warm and dry. Neurological:      Mental Status: He is alert. He is disoriented. GCS: GCS eye subscore is 3. GCS verbal subscore is 3. GCS motor subscore is 5.    Psychiatric:         Behavior: Behavior normal.            DIAGNOSTIC RESULTS     When ordered, EKGs are interpreted by the Emergency Department Physician in the absence of a cardiologist. Please see their note for interpretation of EKG    RADIOLOGY:         Interpretation per the Radiologist below, if available at the time of this note:    [address] [place] [address] CONTRAST   Preliminary Result   No evidence of acute intracranial abnormality. CT CERVICAL SPINE WO CONTRAST   Final Result   No acute abnormality of the cervical spine. XR CHEST PORTABLE   Final Result   No radiographic evidence of acute pulmonary disease.              LABS:  Labs Reviewed   CBC WITH AUTO DIFFERENTIAL - Abnormal; Notable for the following components:       Result Value    WBC 12.4 (*)     Neutrophils Absolute 8.6 (*)     All other components within normal limits    Narrative:     Performed at:  [address] [facility] [place] [address]   Phone [phone]   COMPREHENSIVE METABOLIC PANEL W/ REFLEX TO MG FOR LOW K - Abnormal; Notable for the following components:    Glucose 50 (*)     All other components within normal limits    Narrative:     Performed at:  [address] [place] [address]   Phone [phone]   POCT GLUCOSE - Abnormal; Notable for the following components:    POC Glucose 45 (*)     All other components within normal limits    Narrative:     Performed at:  [address] [facility] [place] [address]   Phone [phone]   POCT GLUCOSE - Abnormal; Notable for the following components:    POC Glucose 121 (*)     All other components within normal limits    Narrative:     Performed at:  [address] [facility] [place] [address]   Phone [phone]   POCT GLUCOSE - Abnormal; Notable for the following components:    POC Glucose 117 (*)     All other components within normal limits    Narrative:     Performed at:  [address] [employer] [address]   Phone [phone]   TROPONIN    Narrative:     Performed at:  [address] [facility] [place] [address]   Phone [phone] URINE RT REFLEX TO CULTURE    Narrative:     Performed at:  [facility]  [address] [place] [address]   Phone [phone]       When ordered, only abnormal lab results are displayed. All other labs are within normal range or not returned as of the dictation. EMERGENCY DEPARTMENT COURSE and DIFFERENTIAL DIAGNOSIS/MDM:   Vitals:    Vitals:    09/14/21 1048 09/14/21 1100 09/14/21 1215 09/14/21 1315   BP: (!) 158/78 (!) 161/86 (!) 154/83 (!) 146/75   Pulse: 66 68 63 65   Resp: 18 17 15 15   Temp: 98.4 °F (36.9 °C)   98.1 °F (36.7 °C)   TempSrc: Oral   Oral   SpO2: 97% 99% 97% 98%   Weight: 264 lb 8.8 oz (120 kg)      Height: 6' 2\" (1.88 m)          I saw this patient with Dr. Phyllis Emery who spent face-to-face time with the patient and agreed with my assessment and plan. The patient was minimally responsive on arrival with a sugar of 45. He was given an amp of D50. On reassessment he is awake and alert. He was able to eat. He has baseline dementia. He keeps repeating that it is 12:00 and he needs to go home. His girlfriend states that he administers his own insulin. Given the possibility of a fall he was sent for CT head and C-spine and these were negative. Chest x-ray clear. Blood work reassuring. Social work got involved and set up home health care to educate the patient's girlfriend on insulin administration. They also had the diabetic educator speak with them in the emergency room. Recommended close PCP follow up. PROCEDURES:  None    FINAL IMPRESSION      1.  Hypoglycemia          DISPOSITION/PLAN   DISPOSITION Decision To Discharge 09/14/2021 01:21:22 PM      PATIENT REFERRED TO:  Allan Adamson MD  [address] [place] [address],[address] [facility] [address] [facility]  [phone]    Schedule an appointment as soon as possible for a visit       [facility] Emergency Department  [address] [place] [address]  [phone]    If symptoms worsen    [address] [address]  [phone]            MEDICATIONS:  Discharge Medication List as of 9/14/2021  1:31 PM          (Please note that portions of this note were completed with a voice recognition program.  Efforts were made toedit the dictations but occasionally words are mis-transcribed.)    NICOLE King PA-C  09/14/21 1521

## 2021-09-14 NOTE — ED NOTES
Pt lethargic and disoriented upon ED arrival, poc glucose 45. JEREMIAS Rojas at bedside. Verbal order for dextrose 25g via IV.       Ephraim Nicholson RN  09/14/21 1098

## 2021-09-14 NOTE — CARE COORDINATION
[facility]  Diabetes Education   Progress Note       NAME:  Jenn Sanchez  MEDICAL RECORD NUMBER:  [id number]  AGE: 61 y.o. GENDER: male  : 1955  TODAY'S DATE:  2021    Subjective   Reason for Diabetic Education Evaluation and Assessment: insulin administration     Bhargavi Rainey agrees that his girlfriend Bunny Martino) should be administering his insulin in the future. Visit Type: evaluation      Jenn Sanchez is a 61 y.o. male referred by:     [] Physician  [] Nursing  [] Chart Review   [x] Other:      PAST MEDICAL HISTORY        Diagnosis Date    Anxiety     Arthritis     CAD (coronary artery disease)     Cancer ([address]     skin-legs,arms,neck-basal cell eye    Depression     Dermatitis     Diabetes mellitus ([address]     Diabetic neuropathy ([address]     GERD (gastroesophageal reflux disease)     Giardia     Hemorrhoids     Hyperlipidemia     Hypertension     Migraine        PAST SURGICAL HISTORY    Past Surgical History:   Procedure Laterality Date    ARTHRODESIS Left 14    Arthrodesis 4 th proximal interphalangeal joint left foot    BLADDER SURGERY      EYE SURGERY      right eye-skin cancer removed    KNEE ARTHROSCOPY Right     x4       FAMILY HISTORY    History reviewed. No pertinent family history. SOCIAL HISTORY    Social History     Tobacco Use    Smoking status: Current Every Day Smoker     Packs/day: 1.50     Years: 40.00     Pack years: 60.00     Types: Cigarettes    Smokeless tobacco: Never Used   Substance Use Topics    Alcohol use: Yes     Alcohol/week: 0.0 standard drinks     Comment: occas    Drug use: No       ALLERGIES    Allergies   Allergen Reactions    Codeine Nausea Only       MEDICATIONS        Objective        Patient Active Problem List   Diagnosis Code    Curry M20.40    Diabetes mellitus type 2, controlled ([address] E11.9    Hypertension I10    Smoking greater than 30 pack years F17.210    Obesity (BMI 30.0-34. 9) E66.9    Atopic rhinitis J30.9    Benign neoplasm of colon D12.6    Benign neoplasm of skin D23.9    Tuberculosis of unspecified bones and joints, confirmation unspecified [id number]    Dermatitis factitia L98.1    Gastroesophageal reflux disease K21.9    Essential hypertension I10    Lipoma of face D17.0    Migraine G43.909    Pain in extremity M79.609    Pruritic rash L28.2    Pure hypercholesterolemia E78.00    Diastasis recti M62.08        BP (!) 161/86   Pulse 68   Temp 98.4 °F (36.9 °C) (Oral)   Resp 17   Ht 6' 2\" (1.88 m)   Wt 264 lb 8.8 oz (120 kg)   SpO2 99%   BMI 33.97 kg/m²     HgBA1c:    Lab Results   Component Value Date    LABA1C 8.4 08/04/2011       Recent Labs     09/14/21  1041 09/14/21  1107 09/14/21  1149   POCGLU 45* 121* 117*       BUN/Creatinine:    Lab Results   Component Value Date    BUN 17 09/14/2021    CREATININE 0.8 09/14/2021       Assessment        Diabetes Management and Education    Does the patient have a Primary Care Physician? Yes, Dr. Karen Galindo     Does the patient require new medication instruction? Yes - change in who will be responsible for insulin administration. Kavita Cook has no prior insulin administration experience. Reviewed basal and prandial insulin concepts. Person responsible for administration of Insulin/Medication:        [] Self     [] Caregiver       [] Spouse       [] Other Family Member   [x]  Other    Insulin Instruction:  insulin pen  Injection Site:   [x] location    [x] rotation     Level of patient/caregiver understanding able to:       [] Verbalized Understanding   [] Demonstrated Understanding       [] Teach Back       [] Needs Reinforcement     []  Other:        Does the patient/caregiver monitor Blood Glucoses? Yes  Reviewed glycemic control targets, testing frequency and when to call PCP.      Level of patient/caregiver understanding able to:        [x] Verbalized Understanding   [] Demonstrated Understanding       [] Teach Back [] Needs Reinforcement     []  Other:      Does the patient/caregiver follow a Meal Plan? No:    Reviewed importance of eating three meals per day and plate method for consistent carb intake. Level of patient/caregiver understanding able to:       [x] Verbalized Understanding   [] Demonstrated Understanding       [] Teach Back       [] Needs Reinforcement     []  Other:      Does the patient/caregiver understand S/S of Hypoglycemia? No:   Reviewed symptoms, prevention and treatment. Level of patient/caregiver understanding able to:       [x] Verbalized Understanding   [] Demonstrated Understanding       [] Teach Back       [] Needs Reinforcement     []  Other:                    Plan        Ongoing diabetes education and blood glucose monitoring.         The following educational and support materials were provided:  · My contact information       · BD booklet - Getting Started        · The Diabetes Education Program:  Planning Healthy Meals   · Academy of Nutrition and Dietetics handout - Carbohydrate Counting for People with Diabetes  · Blood Glucose Log Book                                         Teaching Time Diabetes Education:  20 minutes     Electronically signed by Adina Lowe on 9/14/2021 at 1:18 PM

## 2021-09-14 NOTE — CARE COORDINATION
[facility]  Received referral from case management   Faxed orders to [address] care to see patient by   9/16/21  Victorino Carlson LPN    [address] [facility]. Cell [phone], Fax [phone]

## 2021-09-14 NOTE — ED NOTES
Discharge and education instructions reviewed. Patient verbalized understanding, teach-back successful. Patient denied questions at this time. No acute distress noted. Patient instructed to follow-up as noted - return to emergency department if symptoms worsen. Patient verbalized understanding. Discharged per EDMD with discharge instructions.         Gisele Paniagua RN  09/14/21 8706

## 2021-09-14 NOTE — CARE COORDINATION
TOSHIA met with patient and his girlfriend Shari Otero- discussed coming to the wellness center or having home Care RN come to the home to follow up with Girlfriend administering insulin. They would like Home care vs [address] They do not have transportation. TOSHIA looking for [address] that takes [employer].

## 2021-09-14 NOTE — ED NOTES
Pt given turkey sandwich and juice, ok per PA for pt to eat and drink. Pt tolerated well, no complications.       Etelvina Martinez RN  09/14/21 9417

## 2021-09-15 ENCOUNTER — HOSPITAL ENCOUNTER (INPATIENT)
Age: 66
LOS: 1 days | Discharge: HOME | DRG: 420 | End: 2021-09-16
Attending: EMERGENCY MEDICINE | Admitting: INTERNAL MEDICINE
Payer: MEDICAID

## 2021-09-15 ENCOUNTER — APPOINTMENT (OUTPATIENT)
Dept: GENERAL RADIOLOGY | Age: 66
DRG: 420 | End: 2021-09-15
Payer: MEDICAID

## 2021-09-15 ENCOUNTER — APPOINTMENT (OUTPATIENT)
Dept: CT IMAGING | Age: 66
DRG: 420 | End: 2021-09-15
Payer: MEDICAID

## 2021-09-15 DIAGNOSIS — E16.2 HYPOGLYCEMIA: Primary | ICD-10-CM

## 2021-09-15 LAB
ALBUMIN SERPL-MCNC: 4.5 G/DL (ref 3.4–5)
ALP BLD-CCNC: 71 U/L (ref 40–129)
ALT SERPL-CCNC: 18 U/L (ref 10–40)
ANION GAP SERPL CALCULATED.3IONS-SCNC: 16 MMOL/L (ref 3–16)
AST SERPL-CCNC: 21 U/L (ref 15–37)
BASOPHILS ABSOLUTE: 0.1 K/UL (ref 0–0.2)
BASOPHILS RELATIVE PERCENT: 0.5 %
BILIRUB SERPL-MCNC: 0.4 MG/DL (ref 0–1)
BILIRUBIN DIRECT: <0.2 MG/DL (ref 0–0.3)
BILIRUBIN URINE: NEGATIVE
BILIRUBIN, INDIRECT: NORMAL MG/DL (ref 0–1)
BLOOD, URINE: NEGATIVE
BUN BLDV-MCNC: 15 MG/DL (ref 7–20)
CALCIUM SERPL-MCNC: 9.5 MG/DL (ref 8.3–10.6)
CHLORIDE BLD-SCNC: 106 MMOL/L (ref 99–110)
CLARITY: CLEAR
CO2: 20 MMOL/L (ref 21–32)
COLOR: YELLOW
CREAT SERPL-MCNC: 0.8 MG/DL (ref 0.8–1.3)
EOSINOPHILS ABSOLUTE: 0.3 K/UL (ref 0–0.6)
EOSINOPHILS RELATIVE PERCENT: 1.8 %
GFR AFRICAN AMERICAN: >60
GFR NON-AFRICAN AMERICAN: >60
GLUCOSE BLD-MCNC: 110 MG/DL (ref 70–99)
GLUCOSE BLD-MCNC: 172 MG/DL (ref 70–99)
GLUCOSE BLD-MCNC: 189 MG/DL (ref 70–99)
GLUCOSE BLD-MCNC: 48 MG/DL (ref 70–99)
GLUCOSE BLD-MCNC: 52 MG/DL (ref 70–99)
GLUCOSE BLD-MCNC: 69 MG/DL (ref 70–99)
GLUCOSE BLD-MCNC: 76 MG/DL (ref 70–99)
GLUCOSE BLD-MCNC: 83 MG/DL (ref 70–99)
GLUCOSE BLD-MCNC: 97 MG/DL (ref 70–99)
GLUCOSE URINE: NEGATIVE MG/DL
HCT VFR BLD CALC: 43.4 % (ref 40.5–52.5)
HEMOGLOBIN: 14.3 G/DL (ref 13.5–17.5)
INR BLD: 1.06 (ref 0.88–1.12)
KETONES, URINE: NEGATIVE MG/DL
LACTIC ACID: 3 MMOL/L (ref 0.4–2)
LEUKOCYTE ESTERASE, URINE: NEGATIVE
LYMPHOCYTES ABSOLUTE: 2.2 K/UL (ref 1–5.1)
LYMPHOCYTES RELATIVE PERCENT: 14.8 %
MAGNESIUM: 1.5 MG/DL (ref 1.8–2.4)
MCH RBC QN AUTO: 30.1 PG (ref 26–34)
MCHC RBC AUTO-ENTMCNC: 33 G/DL (ref 31–36)
MCV RBC AUTO: 91.4 FL (ref 80–100)
MICROSCOPIC EXAMINATION: NORMAL
MONOCYTES ABSOLUTE: 0.8 K/UL (ref 0–1.3)
MONOCYTES RELATIVE PERCENT: 5.4 %
NEUTROPHILS ABSOLUTE: 11.4 K/UL (ref 1.7–7.7)
NEUTROPHILS RELATIVE PERCENT: 77.5 %
NITRITE, URINE: NEGATIVE
PDW BLD-RTO: 14.9 % (ref 12.4–15.4)
PERFORMED ON: ABNORMAL
PERFORMED ON: NORMAL
PH UA: 6.5 (ref 5–8)
PLATELET # BLD: 230 K/UL (ref 135–450)
PMV BLD AUTO: 8.8 FL (ref 5–10.5)
POTASSIUM REFLEX MAGNESIUM: 3.5 MMOL/L (ref 3.5–5.1)
PROTEIN UA: NEGATIVE MG/DL
PROTHROMBIN TIME: 12 SEC (ref 9.9–12.7)
RBC # BLD: 4.75 M/UL (ref 4.2–5.9)
SODIUM BLD-SCNC: 142 MMOL/L (ref 136–145)
SPECIFIC GRAVITY UA: 1.01 (ref 1–1.03)
TOTAL CK: 200 U/L (ref 39–308)
TOTAL PROTEIN: 7.6 G/DL (ref 6.4–8.2)
TROPONIN: <0.01 NG/ML
URINE REFLEX TO CULTURE: NORMAL
URINE TYPE: NORMAL
UROBILINOGEN, URINE: 1 E.U./DL
WBC # BLD: 14.7 K/UL (ref 4–11)

## 2021-09-15 PROCEDURE — 80048 BASIC METABOLIC PNL TOTAL CA: CPT

## 2021-09-15 PROCEDURE — 1200000000 HC SEMI PRIVATE

## 2021-09-15 PROCEDURE — 97535 SELF CARE MNGMENT TRAINING: CPT

## 2021-09-15 PROCEDURE — 36415 COLL VENOUS BLD VENIPUNCTURE: CPT

## 2021-09-15 PROCEDURE — 2500000003 HC RX 250 WO HCPCS: Performed by: EMERGENCY MEDICINE

## 2021-09-15 PROCEDURE — 6370000000 HC RX 637 (ALT 250 FOR IP): Performed by: INTERNAL MEDICINE

## 2021-09-15 PROCEDURE — 84484 ASSAY OF TROPONIN QUANT: CPT

## 2021-09-15 PROCEDURE — 70450 CT HEAD/BRAIN W/O DYE: CPT

## 2021-09-15 PROCEDURE — 6360000002 HC RX W HCPCS: Performed by: INTERNAL MEDICINE

## 2021-09-15 PROCEDURE — 85610 PROTHROMBIN TIME: CPT

## 2021-09-15 PROCEDURE — 80076 HEPATIC FUNCTION PANEL: CPT

## 2021-09-15 PROCEDURE — 71045 X-RAY EXAM CHEST 1 VIEW: CPT

## 2021-09-15 PROCEDURE — 83735 ASSAY OF MAGNESIUM: CPT

## 2021-09-15 PROCEDURE — 2580000003 HC RX 258: Performed by: INTERNAL MEDICINE

## 2021-09-15 PROCEDURE — 81003 URINALYSIS AUTO W/O SCOPE: CPT

## 2021-09-15 PROCEDURE — 97162 PT EVAL MOD COMPLEX 30 MIN: CPT

## 2021-09-15 PROCEDURE — 97166 OT EVAL MOD COMPLEX 45 MIN: CPT

## 2021-09-15 PROCEDURE — 97116 GAIT TRAINING THERAPY: CPT

## 2021-09-15 PROCEDURE — 99283 EMERGENCY DEPT VISIT LOW MDM: CPT

## 2021-09-15 PROCEDURE — 2500000003 HC RX 250 WO HCPCS: Performed by: INTERNAL MEDICINE

## 2021-09-15 PROCEDURE — 97530 THERAPEUTIC ACTIVITIES: CPT

## 2021-09-15 PROCEDURE — 85025 COMPLETE CBC W/AUTO DIFF WBC: CPT

## 2021-09-15 PROCEDURE — 83605 ASSAY OF LACTIC ACID: CPT

## 2021-09-15 PROCEDURE — 96374 THER/PROPH/DIAG INJ IV PUSH: CPT

## 2021-09-15 PROCEDURE — 82550 ASSAY OF CK (CPK): CPT

## 2021-09-15 RX ORDER — AMLODIPINE BESYLATE 5 MG/1
10 TABLET ORAL DAILY
Status: DISCONTINUED | OUTPATIENT
Start: 2021-09-15 | End: 2021-09-16 | Stop reason: HOSPADM

## 2021-09-15 RX ORDER — CARVEDILOL 6.25 MG/1
6.25 TABLET ORAL 2 TIMES DAILY WITH MEALS
Status: DISCONTINUED | OUTPATIENT
Start: 2021-09-15 | End: 2021-09-15

## 2021-09-15 RX ORDER — SODIUM CHLORIDE 0.9 % (FLUSH) 0.9 %
10 SYRINGE (ML) INJECTION EVERY 12 HOURS SCHEDULED
Status: DISCONTINUED | OUTPATIENT
Start: 2021-09-15 | End: 2021-09-16 | Stop reason: HOSPADM

## 2021-09-15 RX ORDER — NICOTINE POLACRILEX 4 MG
15 LOZENGE BUCCAL PRN
Status: DISCONTINUED | OUTPATIENT
Start: 2021-09-15 | End: 2021-09-16 | Stop reason: HOSPADM

## 2021-09-15 RX ORDER — SODIUM CHLORIDE 0.9 % (FLUSH) 0.9 %
10 SYRINGE (ML) INJECTION PRN
Status: DISCONTINUED | OUTPATIENT
Start: 2021-09-15 | End: 2021-09-16 | Stop reason: HOSPADM

## 2021-09-15 RX ORDER — DEXTROSE MONOHYDRATE 50 MG/ML
100 INJECTION, SOLUTION INTRAVENOUS PRN
Status: DISCONTINUED | OUTPATIENT
Start: 2021-09-15 | End: 2021-09-16 | Stop reason: HOSPADM

## 2021-09-15 RX ORDER — LANOLIN ALCOHOL/MO/W.PET/CERES
2000 CREAM (GRAM) TOPICAL DAILY
Status: DISCONTINUED | OUTPATIENT
Start: 2021-09-15 | End: 2021-09-16 | Stop reason: HOSPADM

## 2021-09-15 RX ORDER — DULOXETIN HYDROCHLORIDE 30 MG/1
30 CAPSULE, DELAYED RELEASE ORAL DAILY
Status: DISCONTINUED | OUTPATIENT
Start: 2021-09-15 | End: 2021-09-15

## 2021-09-15 RX ORDER — TOPIRAMATE 100 MG/1
100 TABLET, FILM COATED ORAL 2 TIMES DAILY
COMMUNITY

## 2021-09-15 RX ORDER — SODIUM CHLORIDE 9 MG/ML
25 INJECTION, SOLUTION INTRAVENOUS PRN
Status: DISCONTINUED | OUTPATIENT
Start: 2021-09-15 | End: 2021-09-16 | Stop reason: HOSPADM

## 2021-09-15 RX ORDER — ATORVASTATIN CALCIUM 20 MG/1
20 TABLET, FILM COATED ORAL DAILY
Status: DISCONTINUED | OUTPATIENT
Start: 2021-09-15 | End: 2021-09-15

## 2021-09-15 RX ORDER — OMEPRAZOLE 20 MG/1
20 CAPSULE, DELAYED RELEASE ORAL DAILY
COMMUNITY

## 2021-09-15 RX ORDER — DICYCLOMINE HCL 20 MG
20 TABLET ORAL EVERY 6 HOURS
COMMUNITY

## 2021-09-15 RX ORDER — LANOLIN ALCOHOL/MO/W.PET/CERES
2000 CREAM (GRAM) TOPICAL DAILY
COMMUNITY

## 2021-09-15 RX ORDER — VITAMIN B COMPLEX
2000 TABLET ORAL DAILY
Status: DISCONTINUED | OUTPATIENT
Start: 2021-09-15 | End: 2021-09-16 | Stop reason: HOSPADM

## 2021-09-15 RX ORDER — RIBOFLAVIN (VITAMIN B2) 400 MG
400 TABLET ORAL DAILY
COMMUNITY

## 2021-09-15 RX ORDER — ATORVASTATIN CALCIUM 40 MG/1
40 TABLET, FILM COATED ORAL DAILY
COMMUNITY

## 2021-09-15 RX ORDER — HYDROXYZINE PAMOATE 25 MG/1
25 CAPSULE ORAL NIGHTLY PRN
COMMUNITY

## 2021-09-15 RX ORDER — DONEPEZIL HYDROCHLORIDE 5 MG/1
5 TABLET, FILM COATED ORAL NIGHTLY
Status: DISCONTINUED | OUTPATIENT
Start: 2021-09-15 | End: 2021-09-16 | Stop reason: HOSPADM

## 2021-09-15 RX ORDER — CARVEDILOL 6.25 MG/1
25 TABLET ORAL 2 TIMES DAILY WITH MEALS
Status: DISCONTINUED | OUTPATIENT
Start: 2021-09-15 | End: 2021-09-16 | Stop reason: HOSPADM

## 2021-09-15 RX ORDER — ACETAMINOPHEN 650 MG/1
650 SUPPOSITORY RECTAL EVERY 4 HOURS PRN
Status: DISCONTINUED | OUTPATIENT
Start: 2021-09-15 | End: 2021-09-16 | Stop reason: HOSPADM

## 2021-09-15 RX ORDER — POLYETHYLENE GLYCOL 3350 17 G/17G
17 POWDER, FOR SOLUTION ORAL DAILY PRN
Status: DISCONTINUED | OUTPATIENT
Start: 2021-09-15 | End: 2021-09-16 | Stop reason: HOSPADM

## 2021-09-15 RX ORDER — AMLODIPINE BESYLATE 5 MG/1
5 TABLET ORAL DAILY
Status: DISCONTINUED | OUTPATIENT
Start: 2021-09-15 | End: 2021-09-15

## 2021-09-15 RX ORDER — DEXTROSE, SODIUM CHLORIDE, AND POTASSIUM CHLORIDE 5; .45; .3 G/100ML; G/100ML; G/100ML
INJECTION INTRAVENOUS CONTINUOUS
Status: DISCONTINUED | OUTPATIENT
Start: 2021-09-15 | End: 2021-09-16

## 2021-09-15 RX ORDER — HYDROXYZINE PAMOATE 25 MG/1
25 CAPSULE ORAL NIGHTLY PRN
Status: DISCONTINUED | OUTPATIENT
Start: 2021-09-15 | End: 2021-09-16 | Stop reason: HOSPADM

## 2021-09-15 RX ORDER — DULOXETIN HYDROCHLORIDE 60 MG/1
60 CAPSULE, DELAYED RELEASE ORAL DAILY
Status: DISCONTINUED | OUTPATIENT
Start: 2021-09-15 | End: 2021-09-16 | Stop reason: HOSPADM

## 2021-09-15 RX ORDER — TOPIRAMATE 100 MG/1
100 TABLET, FILM COATED ORAL 2 TIMES DAILY
Status: DISCONTINUED | OUTPATIENT
Start: 2021-09-15 | End: 2021-09-16 | Stop reason: HOSPADM

## 2021-09-15 RX ORDER — ONDANSETRON 2 MG/ML
4 INJECTION INTRAMUSCULAR; INTRAVENOUS EVERY 4 HOURS PRN
Status: DISCONTINUED | OUTPATIENT
Start: 2021-09-15 | End: 2021-09-16 | Stop reason: HOSPADM

## 2021-09-15 RX ORDER — DONEPEZIL HYDROCHLORIDE 5 MG/1
5 TABLET, FILM COATED ORAL NIGHTLY
COMMUNITY

## 2021-09-15 RX ORDER — HYDRALAZINE HYDROCHLORIDE 25 MG/1
25 TABLET, FILM COATED ORAL 2 TIMES DAILY
Status: DISCONTINUED | OUTPATIENT
Start: 2021-09-15 | End: 2021-09-15

## 2021-09-15 RX ORDER — DEXTROSE MONOHYDRATE 25 G/50ML
12.5 INJECTION, SOLUTION INTRAVENOUS PRN
Status: DISCONTINUED | OUTPATIENT
Start: 2021-09-15 | End: 2021-09-16 | Stop reason: HOSPADM

## 2021-09-15 RX ORDER — LISINOPRIL 10 MG/1
40 TABLET ORAL DAILY
Status: DISCONTINUED | OUTPATIENT
Start: 2021-09-15 | End: 2021-09-16 | Stop reason: HOSPADM

## 2021-09-15 RX ORDER — PANTOPRAZOLE SODIUM 40 MG/1
40 TABLET, DELAYED RELEASE ORAL
Status: DISCONTINUED | OUTPATIENT
Start: 2021-09-16 | End: 2021-09-16 | Stop reason: HOSPADM

## 2021-09-15 RX ORDER — ACETAMINOPHEN 325 MG/1
650 TABLET ORAL EVERY 4 HOURS PRN
Status: DISCONTINUED | OUTPATIENT
Start: 2021-09-15 | End: 2021-09-16 | Stop reason: HOSPADM

## 2021-09-15 RX ORDER — FUROSEMIDE 40 MG/1
40 TABLET ORAL DAILY
Status: DISCONTINUED | OUTPATIENT
Start: 2021-09-15 | End: 2021-09-15

## 2021-09-15 RX ORDER — ATORVASTATIN CALCIUM 40 MG/1
40 TABLET, FILM COATED ORAL DAILY
Status: DISCONTINUED | OUTPATIENT
Start: 2021-09-15 | End: 2021-09-16 | Stop reason: HOSPADM

## 2021-09-15 RX ORDER — MAGNESIUM SULFATE IN WATER 40 MG/ML
2000 INJECTION, SOLUTION INTRAVENOUS ONCE
Status: COMPLETED | OUTPATIENT
Start: 2021-09-15 | End: 2021-09-15

## 2021-09-15 RX ORDER — LISINOPRIL 10 MG/1
20 TABLET ORAL DAILY
Status: DISCONTINUED | OUTPATIENT
Start: 2021-09-15 | End: 2021-09-15

## 2021-09-15 RX ORDER — DICYCLOMINE HCL 20 MG
20 TABLET ORAL EVERY 6 HOURS
Status: DISCONTINUED | OUTPATIENT
Start: 2021-09-15 | End: 2021-09-16 | Stop reason: HOSPADM

## 2021-09-15 RX ORDER — HYDRALAZINE HYDROCHLORIDE 25 MG/1
50 TABLET, FILM COATED ORAL EVERY 8 HOURS SCHEDULED
Status: DISCONTINUED | OUTPATIENT
Start: 2021-09-15 | End: 2021-09-16 | Stop reason: HOSPADM

## 2021-09-15 RX ADMIN — SODIUM CHLORIDE, PRESERVATIVE FREE 10 ML: 5 INJECTION INTRAVENOUS at 10:00

## 2021-09-15 RX ADMIN — HYDROXYZINE PAMOATE 25 MG: 25 CAPSULE ORAL at 20:50

## 2021-09-15 RX ADMIN — DONEPEZIL HYDROCHLORIDE 5 MG: 5 TABLET, FILM COATED ORAL at 20:50

## 2021-09-15 RX ADMIN — POTASSIUM CHLORIDE, DEXTROSE MONOHYDRATE AND SODIUM CHLORIDE: 300; 5; 450 INJECTION, SOLUTION INTRAVENOUS at 05:16

## 2021-09-15 RX ADMIN — DICYCLOMINE HYDROCHLORIDE 20 MG: 20 TABLET ORAL at 20:50

## 2021-09-15 RX ADMIN — SODIUM CHLORIDE, PRESERVATIVE FREE 10 ML: 5 INJECTION INTRAVENOUS at 20:50

## 2021-09-15 RX ADMIN — AMLODIPINE BESYLATE 10 MG: 5 TABLET ORAL at 15:22

## 2021-09-15 RX ADMIN — ATORVASTATIN CALCIUM 40 MG: 40 TABLET, FILM COATED ORAL at 15:21

## 2021-09-15 RX ADMIN — Medication 2000 UNITS: at 20:50

## 2021-09-15 RX ADMIN — CARVEDILOL 25 MG: 6.25 TABLET, FILM COATED ORAL at 17:03

## 2021-09-15 RX ADMIN — LISINOPRIL 40 MG: 10 TABLET ORAL at 15:22

## 2021-09-15 RX ADMIN — HYDRALAZINE HYDROCHLORIDE 50 MG: 25 TABLET, FILM COATED ORAL at 15:22

## 2021-09-15 RX ADMIN — POTASSIUM CHLORIDE, DEXTROSE MONOHYDRATE AND SODIUM CHLORIDE: 300; 5; 450 INJECTION, SOLUTION INTRAVENOUS at 16:18

## 2021-09-15 RX ADMIN — DULOXETINE HYDROCHLORIDE 60 MG: 60 CAPSULE, DELAYED RELEASE ORAL at 15:22

## 2021-09-15 RX ADMIN — INSULIN LISPRO 1 UNITS: 100 INJECTION, SOLUTION INTRAVENOUS; SUBCUTANEOUS at 20:55

## 2021-09-15 RX ADMIN — TOPIRAMATE 100 MG: 100 TABLET, FILM COATED ORAL at 20:50

## 2021-09-15 RX ADMIN — MAGNESIUM SULFATE HEPTAHYDRATE 2000 MG: 40 INJECTION, SOLUTION INTRAVENOUS at 06:36

## 2021-09-15 RX ADMIN — Medication 400 MG: at 20:50

## 2021-09-15 RX ADMIN — HYDRALAZINE HYDROCHLORIDE 50 MG: 25 TABLET, FILM COATED ORAL at 20:50

## 2021-09-15 RX ADMIN — ENOXAPARIN SODIUM 40 MG: 40 INJECTION SUBCUTANEOUS at 10:30

## 2021-09-15 RX ADMIN — INSULIN LISPRO 2 UNITS: 100 INJECTION, SOLUTION INTRAVENOUS; SUBCUTANEOUS at 17:02

## 2021-09-15 ASSESSMENT — ENCOUNTER SYMPTOMS
STRIDOR: 0
VOMITING: 0
COUGH: 0
RECTAL PAIN: 0
ABDOMINAL PAIN: 0
ANAL BLEEDING: 0
ABDOMINAL DISTENTION: 0
DIARRHEA: 0
CONSTIPATION: 0
APNEA: 0
EYE ITCHING: 0
COLOR CHANGE: 0
EYE PAIN: 0
EYE REDNESS: 0
BACK PAIN: 0
EYE DISCHARGE: 0
NAUSEA: 0
SHORTNESS OF BREATH: 0
CHOKING: 0
CHEST TIGHTNESS: 0
PHOTOPHOBIA: 0
BLOOD IN STOOL: 0
WHEEZING: 0

## 2021-09-15 ASSESSMENT — PAIN SCALES - GENERAL
PAINLEVEL_OUTOF10: 0

## 2021-09-15 NOTE — ED NOTES
ED SBAR report provider to [facility]. Patient to be transported to Room 4115 via stretcher by transport tech. Patient transported with bedside cardiac monitor and with IV medications infusing. IV site clean, dry, and intact. MEWS score and pain assessed and documented. Updated patient and family on plan of care.      Damien Yu RN  09/15/21 2807

## 2021-09-15 NOTE — CARE COORDINATION
TOSHIA met with Nav [place] she requested referral to [facility] and [facility] and rehab. She can walk to both facilities. Referrals made to [facility] RNASREEN Reddy [facility].

## 2021-09-15 NOTE — CARE COORDINATION
09/15/21 1423   Readmission Assessment   Number of Days since last admission? 1-7 days   Previous Disposition Home with Home Health   Who is being Interviewed Caregiver   What was the patient's/caregiver's perception as to why they think they needed to return back to the hospital? Other (Comment)  (fall with low blood sugar)   Did you visit your Primary Care Physician after you left the hospital, before you returned this time? No   Why weren't you able to visit your PCP? Did not have an appointment   Did you see a specialist, such as Cardiac, Pulmonary, Orthopedic Physician, etc. after you left the hospital? No   Who advised the patient to return to the hospital? Caregiver   Does the patient report anything that got in the way of taking their medications? No   In our efforts to provide the best possible care to you and others like you, can you think of anything that we could have done to help you after you left the hospital the first time, so that you might not have needed to return so soon?  Other (Comment)  (Caregiver is unable to care to patient)

## 2021-09-15 NOTE — PROGRESS NOTES
Occupational Therapy   Occupational Therapy Initial Assessment  Date: 9/15/2021   Patient Name: Dejon Pitt  MRN: [id number]     : 1955    Date of Service: 9/15/2021    Discharge Recommendations:  24 hour supervision or assist, Home with [facility] scored a 18/24 on the AM-PAC ADL Inpatient form. Current research shows that an AM-PAC score of 18 or greater is typically associated with a discharge to the patient's home setting. Based on the patient's AM-PAC score, and their current ADL deficits, it is recommended that the patient have 2-3 sessions per week of Occupational Therapy at d/c to increase the patient's independence. At this time, this patient demonstrates the endurance and safety to discharge home with 24 hour assist and a home OT follow up treatment frequency of 2-3x/wk. Please see assessment section for further patient specific details. If patient discharges prior to next session this note will serve as a discharge summary. Please see below for the latest assessment towards goals. Assessment   Performance deficits / Impairments: Decreased functional mobility ; Decreased ADL status; Decreased balance;Decreased cognition;Decreased high-level IADLs  Assessment: Pt is a 61 y.o. male admitted with hypoglycemia. At baseline, pt lives with girlfriend in third floor apartment, independent ADLs and fxl mobility. Pt currently functioning below baseline, being most limited by decreased cognition, currently oriented to person only. Pt completed fxl transfers/mobility with no AD and CGA, toileting CGA, LB dressing mod A, grooming in stance at sink SBA, and anticipate pt would require overall min/mod A for ADLs. Anticipate pt will progress to be able to return home with increased assist and home OT to ensure safe transition home.   Prognosis: Good;Fair  Decision Making: Medium Complexity  OT Education: OT Role;Plan of Care;ADL Adaptive Strategies;Transfer Training;Orientation  Barriers to Learning: cognition, hearing  REQUIRES OT FOLLOW UP: Yes  Activity Tolerance  Activity Tolerance: Treatment limited secondary to decreased cognition  Safety Devices  Safety Devices in place: Yes  Type of devices: Call light within reach; Chair alarm in place; Left in chair;Gait belt;Nurse notified           Patient Diagnosis(es): The encounter diagnosis was Hypoglycemia. has a past medical history of Anxiety, Arthritis, CAD (coronary artery disease), Cancer ([facility] Depression, Dermatitis, Diabetes mellitus ([facility] [address] Diabetic neuropathy ([facility] [address] [facility] GERD (gastroesophageal reflux disease), Giardia, Hemorrhoids, Hyperlipidemia, Hypertension, and Migraine. has a past surgical history that includes Bladder surgery; Knee arthroscopy (Right); eye surgery; and arthrodesis (Left, 9-17-14). Restrictions  Restrictions/Precautions  Restrictions/Precautions: Fall Risk  Position Activity Restriction  Other position/activity restrictions: h/o of low BS; IV    Subjective   General  Chart Reviewed: Yes  Additional Pertinent Hx: Pt is a 61 y.o. male admitted with hypoglycemia. Family / Caregiver Present: Yes (girlfriend)  Referring Practitioner: Yazan Baird MD  Diagnosis: hypoglycemia  Subjective  Subjective: Pt met b/s for OT eval/tx. Pt in bed on arrival, agreeable to participate in therapy. Pt confused, oriented to person only. Pt denies pain.     Social/Functional History  Social/Functional History  Lives With: Significant other  Type of Home: Apartment (3rd floor, 32 steps with rail)  Home Access: Stairs to enter with rails  Entrance Stairs - Number of Steps: 3 or 4 steps with rail  Bathroom Shower/Tub: Tub/Shower unit  Bathroom Toilet: Standard  ADL Assistance: Independent  Homemaking Assistance:  (girlfriend does all)  Ambulation Assistance: Independent (no device)  Transfer Assistance: Independent (sleeps in regular bed)  Active : No  Mode of Transportation: Friends, safety;Decreased awareness of need for assistance  Problem Solving: Decreased awareness of errors;Assistance required to identify errors made;Assistance required to generate solutions  Insights: Decreased awareness of deficits  Initiation: Requires cues for some  Sequencing: Requires cues for some     LUE AROM (degrees)  LUE AROM : WFL  RUE AROM (degrees)  RUE AROM : WFL                      Plan   Plan  Times per week: 3-5  Current Treatment Recommendations: Balance Training, Functional Mobility Training, Safety Education & Training, Cognitive Reorientation, Cognitive/Perceptual Training, Self-Care / ADL, Patient/Caregiver Education & Training, Equipment Evaluation, Education, & procurement             AM-PAC Score        AM-PAC Inpatient Daily Activity Raw Score: 18 (09/15/21 1610)  AM-PAC Inpatient ADL T-Scale Score : 38.66 (09/15/21 1610)  ADL Inpatient CMS 0-100% Score: 46.65 (09/15/21 1610)  ADL Inpatient CMS G-Code Modifier : CK (09/15/21 1610)    Goals  Short term goals  Time Frame for Short term goals: Prior to d/c:  Short term goal 1: Pt will bathe with SBA. Short term goal 2: Pt will dress with SBA. Short term goal 3: Pt will toilet with supervision. Short term goal 4: Pt will complete fxl mobility and fxl transfers to/from ADL surfaces with supervision. Short term goal 5: Further assess pt's cognitive status with efforts to improve pt's orientation, safety awareness, and problem solving. Long term goals  Time Frame for Long term goals : STGs=LTGs  Patient Goals   Patient goals : pt unable to verbalize personal therapy goal d/t decreased cognition.        Therapy Time   Individual Concurrent Group Co-treatment   Time In 1540         Time Out 1609         Minutes 29         Timed Code Treatment Minutes: [address] [place], OTR/L 6546

## 2021-09-15 NOTE — CARE COORDINATION
Discharge Planning:  SW received call from [facility] they are unable to accept patient. Waiting call back from [facility] and Rehab. [facility] [phone] and left message for admissions.   [place] has clinically accepted patient and will [facility].  Sent email to [facility] at [employer] for approval.

## 2021-09-15 NOTE — ED NOTES
Bed: A-17  Expected date: 9/15/21  Expected time: 3:35 AM  Means of arrival: [facility]  Comments:  called this morning for ams blood sugar was 39 ems attempted to give sugar pt spit it out pt has dementia     Brennan Sargent RN  09/15/21 9618

## 2021-09-15 NOTE — ED NOTES
POC blood glucose 69. Pt alert and tolerating po food/fluids without difficulty. Pt given juice and rony crackers with peanut butter. Will monitor closely and recheck blood glucose as ordered.       Etelvina Martinez RN  09/15/21 [address], RN  09/15/21 4149

## 2021-09-15 NOTE — ED TRIAGE NOTES
Patient presents to ED via EMS from home for c/o hypoglycemia, BS 38. Per patient's girlfriend, EMS was called to their home twice today for hypoglycemia. BS upon arrival was 52. Per patient's girlfriend, patient has been having increased confusion and noncompliance. Patient alert to self only. Per patient's girlfriend, patient has had increased confusion over the past few months and states it has been overwhelming to take care of him and would like assistance with resources on what to do to be able to provide him better care.

## 2021-09-15 NOTE — H&P
Hospital Medicine History & Physical      PCP: Virginia Hoskins MD    Date of Admission: 9/15/2021    Date of Service: Pt seen/examined on 9/15/2021    Chief Complaint:      Chief Complaint   Patient presents with    Hypoglycemia     45 at home       History Of Present Illness:   62 yo M with history of Diabetes mellitus presents from home due to altered mental status. Pt unable to provide any significant history due to condition. Apparently per the girlfriend pt ate very little and took whole dose of his insulin. Blood sugar was noted to be in 30s. EMS was called and pt was brought to the hospital    Past Medical History:        Diagnosis Date    Anxiety     Arthritis     CAD (coronary artery disease)     Cancer ([facility] [address]     skin-legs,arms,neck-basal cell eye    Depression     Dermatitis     Diabetes mellitus ([address]     Diabetic neuropathy ([facility] [address]     GERD (gastroesophageal reflux disease)     Giardia     Hemorrhoids     Hyperlipidemia     Hypertension     Migraine        Past Surgical History:        Procedure Laterality Date    ARTHRODESIS Left 9-17-14    Arthrodesis 4 th proximal interphalangeal joint left foot    BLADDER SURGERY      EYE SURGERY      right eye-skin cancer removed    KNEE ARTHROSCOPY Right     x4       Medications Prior to Admission:    Prior to Admission medications    Medication Sig Start Date End Date Taking?  Authorizing Provider   insulin lispro (ADMELOG) 100 UNIT/ML injection vial Inject 20 Units into the skin 3 times daily (before meals)   Yes Historical Provider, MD   atorvastatin (LIPITOR) 40 MG tablet Take 40 mg by mouth daily   Yes Historical Provider, MD   dicyclomine (BENTYL) 20 MG tablet Take 20 mg by mouth every 6 hours   Yes Historical Provider, MD   donepezil (ARICEPT) 5 MG tablet Take 5 mg by mouth nightly   Yes Historical Provider, MD   Lixisenatide (ADLYXIN) 20 MCG/0.2ML SOPN Inject 20 mcg into the skin daily   Yes Historical Provider, MD   omeprazole (PRILOSEC) 20 MG delayed release capsule Take 20 mg by mouth daily   Yes Historical Provider, MD   topiramate (TOPAMAX) 100 MG tablet Take 100 mg by mouth 2 times daily   Yes Historical Provider, MD   vitamin D (CHOLECALCIFEROL) 25 MCG (1000 UT) TABS tablet Take 2,000 Units by mouth daily   Yes Historical Provider, MD   hydrOXYzine (VISTARIL) 25 MG capsule Take 25 mg by mouth nightly as needed (sleep)   Yes Historical Provider, MD   vitamin B-12 (CYANOCOBALAMIN) 1000 MCG tablet Take 2,000 mcg by mouth daily   Yes Historical Provider, MD   Riboflavin 400 MG TABS Take 400 mg by mouth daily   Yes Historical Provider, MD   methocarbamol (ROBAXIN) 750 MG tablet Take 750 mg by mouth 2 times daily as needed (pain)    Yes Historical Provider, MD   metFORMIN (GLUCOPHAGE) 1000 MG tablet Take 1,000 mg by mouth 2 times daily (with meals)    Yes Historical Provider, MD   insulin glargine (LANTUS) 100 UNIT/ML injection vial Inject into the skin 2 times daily Inject 70 units subcutaneously in the morning, and 60 units in the evening   Yes Historical Provider, MD   DULoxetine (CYMBALTA) 60 MG extended release capsule Take 60 mg by mouth daily    Yes Historical Provider, MD   carvedilol (COREG) 25 MG tablet Take 25 mg by mouth 2 times daily    Yes Historical Provider, MD   naproxen (NAPROSYN) 500 MG tablet Take 500 mg by mouth 2 times daily (with meals). Instructed to stop for surgery   Yes Historical Provider, MD   lisinopril (PRINIVIL;ZESTRIL) 40 MG tablet Take 40 mg by mouth daily    Yes Historical Provider, MD   amLODIPine (NORVASC) 10 MG tablet Take 10 mg by mouth daily    Yes Historical Provider, MD   hydrALAZINE (APRESOLINE) 50 MG tablet Take 50 mg by mouth 3 times daily    Yes Historical Provider, MD   Multiple Vitamins-Minerals (MULTIVITAMIN PO) Take 1 capsule by mouth daily    Yes Historical Provider, MD       Allergies:  Codeine    Social History:       reports that he has been smoking cigarettes.  He has a 60.00 pack-year smoking history. He has never used smokeless tobacco. He reports current alcohol use. He reports that he does not use drugs. Family History:  Reviewed in detail and negative for DM, Early CAD, Cancer, CVA. History reviewed. No pertinent family history. REVIEW OF SYSTEMS:   Positive review  noted in the HPI. All other systems reviewed and negative.     PHYSICAL EXAM:    BP (!) 166/74   Pulse 70   Temp 97.4 °F (36.3 °C) (Oral)   Resp 14   Ht 6' 2\" (1.88 m)   Wt 256 lb 13.4 oz (116.5 kg)   SpO2 97%   BMI 32.98 kg/m²   General Appearance: awake, confused  Skin: warm and dry, no rash or erythema  Head: normocephalic and atraumatic  Eyes: pupils equal, round, and reactive to light, extraocular eye movements intact, conjunctivae normal  ENT: tympanic membrane, external ear and ear canal normal bilaterally, nose without deformity, nasal mucosa and turbinates normal without polyps  Neck: supple and non-tender without mass, no thyromegaly or thyroid nodules, no cervical lymphadenopathy  Pulmonary/Chest: clear to auscultation bilaterally- no wheezes, rales or rhonchi, normal air movement, no respiratory distress  Cardiovascular: normal rate, regular rhythm, normal S1 and S2, no murmurs, rubs, clicks, or gallops, Peripheral pulses good, Cap refill <3 sec, no carotid bruits  Abdomen: soft, non-tender, non-distended, normal bowel sounds, no masses or organomegaly  Extremities: no cyanosis, clubbing or edema  Musculoskeletal: normal range of motion, no joint swelling, deformity or tenderness  Neurologic: reflexes normal and symmetric, no cranial nerve deficit, gait, coordination and speech normal      LABS:      CBC   Recent Labs     09/14/21  1055 09/15/21  0357   WBC 12.4* 14.7*   HGB 14.4 14.3   HCT 44.0 43.4    230      RENAL  Recent Labs     09/14/21  1055 09/15/21  0357    142   K 3.7 3.5    106   CO2 21 20*   BUN 17 15   CREATININE 0.8 0.8     LFT'S  Recent Labs     09/14/21  1055 09/15/21  0357   AST 19 21   ALT 17 18   BILIDIR  --  <0.2   BILITOT 0.4 0.4   ALKPHOS 80 71     COAG  Recent Labs     09/15/21  0357   INR 1.06     CARDIAC ENZYMES  Recent Labs     09/14/21  1055 09/15/21  0357   CKTOTAL  --  200   TROPONINI <0.01 <0.01       U/A:    Lab Results   Component Value Date    COLORU YELLOW 09/15/2021    CLARITYU Clear 09/15/2021    SPECGRAV 1.007 09/15/2021    LEUKOCYTESUR Negative 09/15/2021    BLOODU Negative 09/15/2021    GLUCOSEU Negative 09/15/2021       ABG  No results found for: WJY7MZS, BEART, Y2XWNTRT, PHART, THGBART, ENG8PSJ, PO2ART, UUW0KRF    UA:  Recent Labs     09/15/21  0813   COLORU YELLOW   PHUR 6.5   CLARITYU Clear   SPECGRAV 1.007   LEUKOCYTESUR Negative   UROBILINOGEN 1.0   BILIRUBINUR Negative   BLOODU Negative   GLUCOSEU Negative   KETUA Negative       Microbiology:  No results for input(s): LABGRAM, LABANAE, ORG, CXSURG in the last 72 hours. Nasal Culture: No results for input(s): ORG, MRSAPCR in the last 72 hours. Blood Culture: No results for input(s): BC, BLOODCULT2, ORG in the last 72 hours. Fungal Culture:   No results for input(s): FUNGSM in the last 72 hours. No results for input(s): FUNCXBLD in the last 72 hours. CSF Culture:  No results for input(s): COLORCSF, APPEARCSF, CFTUBE, CLOTCSF, WBCCSF, RBCCSF, NEUTCSF, NUMCELLSCSF, LYMPHSCSF, MONOCSF, GLUCCSF, VOLCSF in the last 72 hours. Respiratory Culture:  No results for input(s): June Danny in the last 72 hours. AFB:No results for input(s): AFBSMEAR in the last 72 hours. Urine Culture  No results for input(s): LABURIN in the last 72 hours. RADIOLOGY:    CT HEAD WO CONTRAST   Final Result   No acute intracranial abnormality. XR CHEST PORTABLE   Final Result   1. Bibasilar airspace opacities most likely due to atelectasis, although   pneumonia could appear similar. 2. Pulmonary vascular congestion and mild cardiomegaly.              Previous medical records personally reviewed and analyzed         PHYSICIAN CERTIFICATION    I certify that Jenn Sanchez is expected to be hospitalized for >2 midnights based on the following assessment and plan:    ASSESSMENT/PLAN:  Active Hospital Problems    Diagnosis Date Noted    Hypoglycemia [E16.2] 09/15/2021     Acute encephalopathy 2/2 hypoglycemia  -hold home insulin regimen  - on D5  -continue to monitor BGL  -start low dose ISS    HTN  -continue lisinopril, coreg, hydralazine, norvasc    Coronary artery disease  -continue aspirin, lipitor    Dementia  -continue Aricept    DVT Prophylaxis: lovenox  Diet: ADULT DIET; Regular  Code Status: Full Code    Dispo - pending improvement       Minor MD Jennifer  The note was completed using EMR. Every effort was made to ensure accuracy; however, inadvertent computerized transcription errors may be present. Thank you Vasu Crow MD for the opportunity to be involved in this patient's care. If you have any questions or concerns please feel free to contact me at [phone].

## 2021-09-15 NOTE — PROGRESS NOTES
4 Eyes Skin Assessment     NAME:  Kush Rios OF BIRTH:  1955  MEDICAL RECORD NUMBER:  [id number]    The patient is being assess for  Admission    I agree that 2 RN's have performed a thorough Head to Toe Skin Assessment on the patient. ALL assessment sites listed below have been assessed. Areas assessed by both nurses:    Head, Face, Ears, Shoulders, Back, Chest, Arms, Elbows, Hands, Sacrum. Buttock, Coccyx, Ischium and Legs. Feet and Heels        Does the Patient have a Wound?  No noted wound(s)       Jose De Jesus Prevention initiated:  No   Wound Care Orders initiated:  No    Pressure Injury (Stage 3,4, Unstageable, DTI, NWPT, and Complex wounds) if present place consult order under [de-identified] No    New and Established Ostomies if present place consult order under : No      Nurse 1 eSignature: Electronically signed by Markel Marques RN on 9/15/21 at 4:31 PM EDT    **SHARE this note so that the co-signing nurse is able to place an eSignature**    Nurse 2 eSignature: Electronically signed by Dewey Martinez RN on 9/15/21 at 4:59 PM EDT

## 2021-09-15 NOTE — ED NOTES
Pt incontinent of urine. Stefania care given. New depends placed, gown and linens changed.       Robson Jose RN  09/15/21 6926

## 2021-09-15 NOTE — PROGRESS NOTES
Medication Reconciliation    List of medications for Juliet Steward is currently taking is complete.      Source of Information:   Epic records  Conversation with patient and pharmacy Brad Rosenbergs [phone])      Allergies  Allergy list not thoroughly reviewed with patient at this time  Allergies listed in Epic as follows: Codeine       Notes Regarding Home Medications:   Patient did not receive any of their home medications prior to arrival to the emergency department  Patient unsure of what medications he takes but informed me he uses [address] [employer] provided me with all current medications       [employer], Pharmacy intern  9/15/2021 11:05 AM

## 2021-09-15 NOTE — CARE COORDINATION
TOSHIA met with patient and his girlfriend yesterday, [address] called girlfriend today left message requesting call back.

## 2021-09-15 NOTE — CARE COORDINATION
INITIAL CASE MANAGEMENT ASSESSMENT    Reviewed chart, met with patient to assess possible discharge needs. Explained Case Management role/services. Living Situation:Lives on apartment with Jarodfriend Jodelle Harada     ADLs: independent     DME: diabetic supplies    PT/OT Recs: TBD     Active Services: set up [facility] 9/14/21, returned to ED before home care could come out. Transportation:      Medications:     PCP: Virginia Hoskins MD      HD/PD: N/A    PLAN/COMMENTS:  Patient unable to manage his diabetes due to increase dementia, girlfriend also unable to manage insulin for patient due to her own disability. The Plan for Transition of Care is related to the following treatment goals: discharge to SNF    The Patient and/or patient representative- Jarodfriend - Jodelle Harada was provided with a choice of provider and agrees   with the discharge plan. [x] Yes [] No    Freedom of choice list was provided with basic dialogue that supports the patient's individualized plan of care/goals, treatment preferences and shares the quality data associated with the providers. [x] Yes [] No    SW/CM provided contact information for patient or family to call with any questions. SW/CM will follow and assist as needed.

## 2021-09-15 NOTE — ED PROVIDER NOTES
[address]      Pt Name: Shelly Morales  MRN: [id number]  [place] 1955  Date of evaluation: 9/15/2021  Provider: Leandro Driscoll MD    CHIEF COMPLAINT       Chief Complaint   Patient presents with    Hypoglycemia     38 at home       [facility]    Shelly Morales is a 61 y.o. male who presents to the emergency department with hypoglycemia. Patient endorses persistent hypoglycemia for the last few hours. Took his usual dose of insulin and short acting today. Sugar was found to be 38 at home. Patient was minimally responsive. Sugar has markedly improved since getting to the emergency room. Patient states this has happened in the past.  States the last couple days he has been confused. States he has not been himself. Denies fevers or chills. Symptoms started spontaneously. Waxing and waning. No other associated symptoms. Nursing Notes were reviewed. Including nursing noted for FM, Surgical History, Past Medical History, Social History, vitals, and allergies; agree with all. REVIEW OF SYSTEMS       Review of Systems   Constitutional: Negative for activity change, appetite change, chills, diaphoresis, fatigue, fever and unexpected weight change. HENT: Negative for congestion, dental problem, drooling, ear discharge and ear pain. Eyes: Negative for photophobia, pain, discharge, redness, itching and visual disturbance. Respiratory: Negative for apnea, cough, choking, chest tightness, shortness of breath, wheezing and stridor. Cardiovascular: Negative for chest pain, palpitations and leg swelling. Gastrointestinal: Negative for abdominal distention, abdominal pain, anal bleeding, blood in stool, constipation, diarrhea, nausea, rectal pain and vomiting. Endocrine: Negative for cold intolerance and heat intolerance. Genitourinary: Negative for decreased urine volume and urgency.    Musculoskeletal: Negative for arthralgias and back pain. Skin: Negative for color change and pallor. Neurological: Negative for tremors and facial asymmetry. Hematological: Negative for adenopathy. Does not bruise/bleed easily. Psychiatric/Behavioral: Positive for confusion. Negative for agitation, behavioral problems and decreased concentration. Except as noted above the remainder of the review of systems was reviewed and negative. PAST MEDICAL HISTORY     Past Medical History:   Diagnosis Date    Anxiety     Arthritis     CAD (coronary artery disease)     Cancer ([facility]     skin-legs,arms,neck-basal cell eye    Depression     Dermatitis     Diabetes mellitus ([facility]     Diabetic neuropathy (HCC)     GERD (gastroesophageal reflux disease)     Giardia     Hemorrhoids     Hyperlipidemia     Hypertension     Migraine        SURGICAL HISTORY       Past Surgical History:   Procedure Laterality Date    ARTHRODESIS Left 9-17-14    Arthrodesis 4 th proximal interphalangeal joint left foot    BLADDER SURGERY      EYE SURGERY      right eye-skin cancer removed    KNEE ARTHROSCOPY Right     x4       CURRENT MEDICATIONS       Previous Medications    AMLODIPINE (NORVASC) 5 MG TABLET    Take 5 mg by mouth daily. CARVEDILOL (COREG) 6.25 MG TABLET    Take 6.25 mg by mouth 2 times daily (with meals). DULOXETINE (CYMBALTA) 30 MG CAPSULE    Take 30 mg by mouth daily. FENOFIBRATE 160 MG TABLET    Take 160 mg by mouth daily. FOLIC ACID-PYRIDOXINE-CYANOCOBALAMINE (FOLTX) 2.5-25-1 MG TABS TABLET    Take 1 tablet by mouth daily    FUROSEMIDE (LASIX) 40 MG TABLET    Take 1 tablet by mouth daily    HYDRALAZINE (APRESOLINE) 25 MG TABLET    Take 25 mg by mouth 2 times daily. HYDROCORTISONE (ANUSOL-HC) 25 MG SUPPOSITORY    Place 1 suppository rectally 2 times daily    INSULIN ASPART (NOVOLOG) 100 UNIT/ML INJECTION VIAL    Inject  into the skin 3 times daily (before meals).  Sliding scale    INSULIN GLARGINE Feeling of Stress :    Social Connections:     Frequency of Communication with Friends and Family:     Frequency of Social Gatherings with Friends and Family:     Attends Synagogue Services:     Active Member of Clubs or Organizations:     Attends Club or Organization Meetings:     Marital Status:    Intimate Partner Violence:     Fear of Current or Ex-Partner:     Emotionally Abused:     Physically Abused:     Sexually Abused:        PHYSICAL EXAM       ED Triage Vitals [09/15/21 0352]   BP Temp Temp Source Pulse Resp SpO2 Height Weight   (!) 152/79 97.8 °F (36.6 °C) Oral 75 22 99 % -- 259 lb 7.7 oz (117.7 kg)       Physical Exam  Vitals and nursing note reviewed. Constitutional:       General: He is not in acute distress. Appearance: He is well-developed. He is not diaphoretic. HENT:      Head: Normocephalic and atraumatic. Eyes:      General:         Right eye: No discharge. Left eye: No discharge. Pupils: Pupils are equal, round, and reactive to light. Neck:      Thyroid: No thyromegaly. Trachea: No tracheal deviation. Cardiovascular:      Rate and Rhythm: Normal rate and regular rhythm. Heart sounds: No murmur heard. Pulmonary:      Breath sounds: No wheezing or rales. Chest:      Chest wall: No tenderness. Abdominal:      General: There is no distension. Palpations: Abdomen is soft. There is no mass. Tenderness: There is no abdominal tenderness. There is no guarding or rebound. Musculoskeletal:         General: No tenderness or deformity. Normal range of motion. Cervical back: Normal range of motion. Skin:     General: Skin is warm. Coloration: Skin is not pale. Findings: No erythema or rash. Neurological:      Mental Status: He is alert. He is disoriented. Cranial Nerves: No cranial nerve deficit. Motor: No abnormal muscle tone.       Coordination: Coordination normal.         DIAGNOSTIC RESULTS     RADIOLOGY: Non-plain film images such as CT, Ultrasoundand MRI are read by the radiologist. Geri Bundy radiographic images are visualized and preliminarily interpreted by the emergency physician with the below findings:    CT head and x-ray reassuring    ED BEDSIDE ULTRASOUND:   Performed by ED Physician - none    LABS:  Labs Reviewed   CBC WITH AUTO DIFFERENTIAL - Abnormal; Notable for the following components:       Result Value    WBC 14.7 (*)     Neutrophils Absolute 11.4 (*)     All other components within normal limits    Narrative:     Performed at:  [address] [place] [address]   Phone [phone]   BASIC METABOLIC PANEL W/ REFLEX TO MG FOR LOW K - Abnormal; Notable for the following components:    CO2 20 (*)     Glucose 48 (*)     All other components within normal limits    Narrative:     [employer] [employer] tel. [phone],  Chemistry results called to and read back by Alecia Rich RN, 09/15/2021 04:59,  by [facility] GE[place]ARCIA  Performed at:  [address] [place] [address]   Phone [phone]   LACTIC ACID, PLASMA - Abnormal; Notable for the following components:    Lactic Acid 3.0 (*)     All other components within normal limits    Narrative:     Performed at:  [address] [place] [address]   Phone [phone]   MAGNESIUM - Abnormal; Notable for the following components:    Magnesium 1.50 (*)     All other components within normal limits    Narrative:     Shanna [facility] tel. [phone],  Chemistry results called to and read back by Alecia Rich RN, 09/15/2021 04:59,  by [facility] GEOVANI GARCIA  Performed at:  [address] [place] [address]   Phone [phone]   POCT GLUCOSE - Abnormal; Notable for the following components:    POC Glucose 52 (*)     All other components within normal limits    Narrative: Performed at:  [facility]  [address] [place], [place] [address]   Phone [phone]   HEPATIC FUNCTION PANEL    Narrative:     Marie Contreras tel. [phone],  Chemistry results called to and read back by Nneka Saavedra RN, 09/15/2021 04:59,  by [facility] [place]  Performed at:  [facility]  [address] [facility] [place] [address]   Phone [phone]   PROTIME-INR    Narrative:     Performed at:  [facility]  [address] [place] [place] [address]   Phone [phone]   TROPONIN    Narrative:     Performed at:  [facility]  [address] [place] [place] [address]   Phone [phone]   CK    Narrative:     Marie Contreras tel. [phone],  Chemistry results called to and read back by Nneka aSavedra RN, 09/15/2021 04:59,  by [facility] [place]  Performed at:  [facility]  [address] [facility] [place] [address]   Phone [phone]   URINE RT REFLEX TO CULTURE   POCT GLUCOSE    Narrative:     Performed at:  [facility]  [address] [facility] [place] [address]   Phone [phone]       All other labs were withinnormal range or not returned as of this dictation. EMERGENCY DEPARTMENT COURSE and DIFFERENTIAL DIAGNOSIS/MDM:     PMH, Surgical Hx, FH, Social Hx reviewed by myself (ETOH usage, Tobacco usage, Drug usage reviewed by myself, no pertinent Hx)- No Pertinent Hx     Old records were reviewed by me     62-year-old with hyperglycemia. D5 half-normal saline started for persistent hypoglycemia. Patient also will need placement for nursing home versus assisted living as girlfriend can no longer take care of him. Admission for further inpatient evaluation. CRITICAL CARE TIME   Total Critical Caretime was 39 minutes, excluding separately reportable procedures.   There was a high probability of clinically

## 2021-09-15 NOTE — PROGRESS NOTES
Physical Therapy    Facility/Department: [facility] 4 MED SURG  Initial Assessment  If patient discharges prior to next session this note will serve as a discharge summary. Please see below for the latest assessment towards goals. NAME: Savannah Cadet  : 1955  MRN: [id number]    Date of Service: 9/15/2021    Discharge Recommendations:  Continue to assess pending progress, 24 hour supervision or assist, Patient would benefit from continued therapy after discharge, S Level 1, Home with Home health PT   Savannah Cadet scored a 18/24 on the AM-PAC short mobility form. Current research shows that an AM-PAC score of 18 or greater is typically associated with a discharge to the patient's home setting. Based on the patient's AM-PAC score and their current functional mobility deficits, it is recommended that the patient have 2-3 sessions per week of Physical Therapy at d/c to increase the patient's independence. At this time, this patient demonstrates the endurance and safety to discharge home with 24/7 assist and home PT and a follow up treatment frequency of 2-3x/wk. Please see assessment section for further patient specific details. PT Equipment Recommendations  Other: will monitor    Assessment   Assessment: The pt is a 62 yo male who came to the ED via squad for hypoglycemia; BS at 38 at home and the pt presented with confusion and decreased responsiveness. The pt just in the ED the day prior for the same thing and sent home with [facility] but the pt came back to ED prior to them coming to the house. The pt's girlfriend reports she can no longer care for him at home at this time. The pt and his girlfriend live in a 3rd floor apartment with 32 steps access. The pt's girlfriend reports that last week the pt was indep in ambulation and indep in self-care but since this Monday he has been unable to control his blood sugar and has had a fall.        PMHx: anxiety, arth, CAD, skin Ca, depression, DM, diabetic neuropathy, HTN, migraines, R knee scope, Arthrodesis 4 th proximal interphalangeal joint left foot        Today, the pt demonstrated that he is confused and oriented only to himself. He does have good strength in B LE's and completed bed mobility with min A, transfers with CGA and ambulated without a device in the room with CGA. He had no LOB. The pt's am-pac score is borderline and anticipate that he will improve with therapy while in the hospital. Although the pt's girlfriend is hesitant about him coming home, he may be safe for home with increased asisst to manage his BS. Will con't to follow. Prognosis: Good  Decision Making: Medium Complexity  PT Education: PT Role;General Safety  Barriers to Learning: [place], cog  REQUIRES PT FOLLOW UP: Yes  Activity Tolerance  Activity Tolerance: Patient Tolerated treatment well       Patient Diagnosis(es): The encounter diagnosis was Hypoglycemia. has a past medical history of Anxiety, Arthritis, CAD (coronary artery disease), Cancer ([address] Depression, Dermatitis, Diabetes mellitus ([address] Diabetic neuropathy ([address] GERD (gastroesophageal reflux disease), Giardia, Hemorrhoids, Hyperlipidemia, Hypertension, and Migraine. has a past surgical history that includes Bladder surgery; Knee arthroscopy (Right); eye surgery; and arthrodesis (Left, 9-17-14). Restrictions  Restrictions/Precautions  Restrictions/Precautions: Fall Risk  Position Activity Restriction  Other position/activity restrictions: h/o of low BS; IV  Vision/Hearing  Vision: Impaired  Vision Exceptions: Wears glasses for reading  Hearing: Exceptions to [facility]  Hearing Exceptions: Hard of hearing/hearing concerns; No hearing aid     Subjective  General  Chart Reviewed: Yes  Additional Pertinent Hx: Per ED note of Jose Eduardo Alfonso MD on 9-: The pt is a 60 yo male who came to the ED via squad for hypoglycemia; BS at 38 at home and the pt presented with confusion and decreased responsiveness.  The pt just in the ED the day prior for the same thing and sent home with [facility] but the pt came back to ED prior to them coming to the house. The pt's girlfriend reports she can no longer care for him at home at this time. PMHx: anxiety, arth, CAD, skin Ca, depression, DM, diabetic neuropathy, HTN, migraines, R knee scope, Arthrodesis 4 th proximal interphalangeal joint left foot  Response To Previous Treatment: Not applicable  Family / Caregiver Present: Yes (girlfriend, Mark Corona)  Referring Practitioner: Isela Billingsley MD  Referral Date : 09/15/21  Diagnosis: Hypoglycemia  Follows Commands: Impaired  Subjective  Subjective: the pt was found to be in the bed; he was awake and agreeable; he is confused and has difficulty following directions due to being very [place]  Pain Screening  Patient Currently in Pain: No          Orientation  Orientation  Overall Orientation Status: Impaired  Orientation Level: Oriented to person;Disoriented to place; Disoriented to situation;Disoriented to time  Social/Functional History  Social/Functional History  Lives With: Significant other  Type of Home: Apartment (3rd floor, 32 steps with rail)  Home Access: Stairs to enter with rails  Entrance Stairs - Number of Steps: 3 or 4 steps with rail  Bathroom Shower/Tub: Tub/Shower unit  Bathroom Toilet: Standard  ADL Assistance: Independent  Homemaking Assistance:  (girlfriend does all)  Ambulation Assistance: Independent (no device)  Transfer Assistance: Independent (sleeps in regular bed)  Active : No  Mode of Transportation: Friends, Family  Occupation: On disability  Additional Comments: one fall recently; the pt's girlfriend reports that last week the pt was indep in self-care and ambulation without a walker and all these issues have started this week  Cognition   Cognition  Overall Cognitive Status: Exceptions  Arousal/Alertness: Delayed responses to stimuli  Following Commands:  Follows one step commands with increased time  Attention Span: 1603)  AM-PAC Inpatient T-Scale Score : 43.63 (09/15/21 1603)  Mobility Inpatient CMS 0-100% Score: 46.58 (09/15/21 1603)  Mobility Inpatient CMS G-Code Modifier : CK (09/15/21 1603)          Goals  Short term goals  Time Frame for Short term goals: upon d/c  Short term goal 1: Bed mobility with mod I. Short term goal 2: Transfers sit <> stand with SBA. Short term goal 3: Ambulate without device 100 feet with SBA. Short term goal 4: Negotiate steps with rail with SBA.   Patient Goals   Patient goals : the pt wants to go home tomorrow       Therapy Time   Individual Concurrent Group Co-treatment   Time In 1522         Time Out 1612         Minutes 50         Timed Code Treatment Minutes: 35 Minutes     Electronically signed by Fatuma Malhotra, PT [id number] on 9/15/2021 at 4:14 PM

## 2021-09-15 NOTE — PROGRESS NOTES
Admitted patient to room 4115 from the Emergency Room with hypoglycemia. VS stable (see flowsheet). Patient's breathing regular and unlabored, denies pain. Oriented to room, call light, TV, phone, patient rights and responsibilities. Bed in lowest position and locked, exit alarm in place. Non-slip socks on. ID bracelet on and correct per pt verbally reporting name and date of birth. Call light within reach. Needed items within reach.

## 2021-09-16 VITALS
OXYGEN SATURATION: 98 % | DIASTOLIC BLOOD PRESSURE: 82 MMHG | HEART RATE: 63 BPM | TEMPERATURE: 98 F | HEIGHT: 74 IN | BODY MASS INDEX: 32.96 KG/M2 | WEIGHT: 256.84 LBS | SYSTOLIC BLOOD PRESSURE: 149 MMHG | RESPIRATION RATE: 16 BRPM

## 2021-09-16 LAB
ANION GAP SERPL CALCULATED.3IONS-SCNC: 10 MMOL/L (ref 3–16)
ANISOCYTOSIS: ABNORMAL
BASOPHILS ABSOLUTE: 0.1 K/UL (ref 0–0.2)
BASOPHILS RELATIVE PERCENT: 0.8 %
BUN BLDV-MCNC: 9 MG/DL (ref 7–20)
CALCIUM SERPL-MCNC: 8.9 MG/DL (ref 8.3–10.6)
CHLORIDE BLD-SCNC: 111 MMOL/L (ref 99–110)
CO2: 21 MMOL/L (ref 21–32)
CREAT SERPL-MCNC: 0.7 MG/DL (ref 0.8–1.3)
EOSINOPHILS ABSOLUTE: 0.3 K/UL (ref 0–0.6)
EOSINOPHILS RELATIVE PERCENT: 2.8 %
GFR AFRICAN AMERICAN: >60
GFR NON-AFRICAN AMERICAN: >60
GLUCOSE BLD-MCNC: 140 MG/DL (ref 70–99)
GLUCOSE BLD-MCNC: 147 MG/DL (ref 70–99)
GLUCOSE BLD-MCNC: 199 MG/DL (ref 70–99)
HCT VFR BLD CALC: 42.7 % (ref 40.5–52.5)
HEMOGLOBIN: 14.1 G/DL (ref 13.5–17.5)
LYMPHOCYTES ABSOLUTE: 2.7 K/UL (ref 1–5.1)
LYMPHOCYTES RELATIVE PERCENT: 25.1 %
MCH RBC QN AUTO: 30.1 PG (ref 26–34)
MCHC RBC AUTO-ENTMCNC: 33 G/DL (ref 31–36)
MCV RBC AUTO: 91.4 FL (ref 80–100)
MONOCYTES ABSOLUTE: 1 K/UL (ref 0–1.3)
MONOCYTES RELATIVE PERCENT: 9.6 %
NEUTROPHILS ABSOLUTE: 6.6 K/UL (ref 1.7–7.7)
NEUTROPHILS RELATIVE PERCENT: 61.7 %
PDW BLD-RTO: 15.1 % (ref 12.4–15.4)
PERFORMED ON: ABNORMAL
PERFORMED ON: ABNORMAL
PLATELET # BLD: 181 K/UL (ref 135–450)
PLATELET SLIDE REVIEW: ADEQUATE
PMV BLD AUTO: 9.6 FL (ref 5–10.5)
POTASSIUM REFLEX MAGNESIUM: 3.9 MMOL/L (ref 3.5–5.1)
RBC # BLD: 4.67 M/UL (ref 4.2–5.9)
SLIDE REVIEW: ABNORMAL
SODIUM BLD-SCNC: 142 MMOL/L (ref 136–145)
WBC # BLD: 10.7 K/UL (ref 4–11)

## 2021-09-16 PROCEDURE — 36415 COLL VENOUS BLD VENIPUNCTURE: CPT

## 2021-09-16 PROCEDURE — 6370000000 HC RX 637 (ALT 250 FOR IP): Performed by: INTERNAL MEDICINE

## 2021-09-16 PROCEDURE — 2500000003 HC RX 250 WO HCPCS: Performed by: INTERNAL MEDICINE

## 2021-09-16 PROCEDURE — 97116 GAIT TRAINING THERAPY: CPT

## 2021-09-16 PROCEDURE — 2580000003 HC RX 258: Performed by: INTERNAL MEDICINE

## 2021-09-16 PROCEDURE — 85025 COMPLETE CBC W/AUTO DIFF WBC: CPT

## 2021-09-16 PROCEDURE — 97110 THERAPEUTIC EXERCISES: CPT

## 2021-09-16 PROCEDURE — 6360000002 HC RX W HCPCS: Performed by: INTERNAL MEDICINE

## 2021-09-16 PROCEDURE — 80048 BASIC METABOLIC PNL TOTAL CA: CPT

## 2021-09-16 RX ORDER — INSULIN GLARGINE 100 [IU]/ML
20 INJECTION, SOLUTION SUBCUTANEOUS NIGHTLY
Qty: 10 ML | Refills: 3 | Status: SHIPPED | OUTPATIENT
Start: 2021-09-16

## 2021-09-16 RX ADMIN — TOPIRAMATE 100 MG: 100 TABLET, FILM COATED ORAL at 08:41

## 2021-09-16 RX ADMIN — SODIUM CHLORIDE, PRESERVATIVE FREE 10 ML: 5 INJECTION INTRAVENOUS at 10:15

## 2021-09-16 RX ADMIN — DICYCLOMINE HYDROCHLORIDE 20 MG: 20 TABLET ORAL at 08:41

## 2021-09-16 RX ADMIN — CYANOCOBALAMIN TAB 1000 MCG 2000 MCG: 1000 TAB at 08:41

## 2021-09-16 RX ADMIN — ENOXAPARIN SODIUM 40 MG: 40 INJECTION SUBCUTANEOUS at 08:40

## 2021-09-16 RX ADMIN — DULOXETINE HYDROCHLORIDE 60 MG: 60 CAPSULE, DELAYED RELEASE ORAL at 08:41

## 2021-09-16 RX ADMIN — PANTOPRAZOLE SODIUM 40 MG: 40 TABLET, DELAYED RELEASE ORAL at 06:39

## 2021-09-16 RX ADMIN — ATORVASTATIN CALCIUM 40 MG: 40 TABLET, FILM COATED ORAL at 08:41

## 2021-09-16 RX ADMIN — INSULIN LISPRO 2 UNITS: 100 INJECTION, SOLUTION INTRAVENOUS; SUBCUTANEOUS at 08:54

## 2021-09-16 RX ADMIN — AMLODIPINE BESYLATE 10 MG: 5 TABLET ORAL at 08:41

## 2021-09-16 RX ADMIN — Medication 2000 UNITS: at 08:40

## 2021-09-16 RX ADMIN — DICYCLOMINE HYDROCHLORIDE 20 MG: 20 TABLET ORAL at 02:38

## 2021-09-16 RX ADMIN — HYDRALAZINE HYDROCHLORIDE 50 MG: 25 TABLET, FILM COATED ORAL at 06:39

## 2021-09-16 RX ADMIN — INSULIN LISPRO 2 UNITS: 100 INJECTION, SOLUTION INTRAVENOUS; SUBCUTANEOUS at 12:00

## 2021-09-16 RX ADMIN — LISINOPRIL 40 MG: 10 TABLET ORAL at 08:41

## 2021-09-16 RX ADMIN — Medication 400 MG: at 08:44

## 2021-09-16 RX ADMIN — CARVEDILOL 25 MG: 6.25 TABLET, FILM COATED ORAL at 08:41

## 2021-09-16 RX ADMIN — POTASSIUM CHLORIDE, DEXTROSE MONOHYDRATE AND SODIUM CHLORIDE: 300; 5; 450 INJECTION, SOLUTION INTRAVENOUS at 03:01

## 2021-09-16 ASSESSMENT — PAIN SCALES - GENERAL: PAINLEVEL_OUTOF10: 0

## 2021-09-16 NOTE — DISCHARGE INSTR - COC
Continuity of Care Form    Patient Name: Vannessa Galindo   :  1955  MRN:  [id number]    Admit date:  9/15/2021  Discharge date:  2021    Code Status Order: Full Code   Advance Directives:      Admitting Physician:  Davida Moscoso MD  PCP: Desi Landry MD    Discharging Nurse: [facility] Unit/Room#: Z8N-3352/4663-76  Discharging Unit Phone Number: [phone]    Emergency Contact:   Extended Emergency Contact Information  Primary Emergency Contact: Carolyn Cavazos  Address: [address] [place] [address] Phone: [phone]  Mobile Phone: [phone]  Relation: Other    Past Surgical History:  Past Surgical History:   Procedure Laterality Date    ARTHRODESIS Left 14    Arthrodesis 4 th proximal interphalangeal joint left foot    BLADDER SURGERY      EYE SURGERY      right eye-skin cancer removed    KNEE ARTHROSCOPY Right     x4       Immunization History:   Immunization History   Administered Date(s) Administered    COVID-19, Pfizer, PF, 30mcg/0.3mL 2021, 2021       Active Problems:  Patient Active Problem List   Diagnosis Code    Tavia[place]oe M20.40    Diabetes mellitus type 2, controlled ([address] E11.9    Hypertension I10    Smoking greater than 30 pack years F17.210    Obesity (BMI 30.0-34. 9) E66.9    Atopic rhinitis J30.9    Benign neoplasm of colon D12.6    Benign neoplasm of skin D23.9    Tuberculosis of unspecified bones and joints, confirmation unspecified [id number]    Dermatitis factitia L98.1    Gastroesophageal reflux disease K21.9    Essential hypertension I10    Lipoma of face D17.0    Migraine G43.909    Pain in extremity M79.609    Pruritic rash L28.2    Pure hypercholesterolemia E78.00    Diastasis recti M62.08    Hypoglycemia E16.2       Isolation/Infection:   Isolation            No Isolation          Patient Infection Status       None to display            Nurse Assessment:  Last Vital Signs: BP (!) 169/79   Pulse 56   Temp 98.1 °F (36.7 °C) (Oral)   Resp 16   Ht 6' 2\" (1.88 m)   Wt 256 lb 13.4 oz (116.5 kg)   SpO2 99%   BMI 32.98 kg/m²     Last documented pain score (0-10 scale): Pain Level: 0  Last Weight:   Wt Readings from Last 1 Encounters:   09/16/21 256 lb 13.4 oz (116.5 kg)     Mental Status:  disoriented and alert    IV Access:  - None    Nursing Mobility/ADLs:  Walking   Independent  Transfer  Independent  Bathing  Independent  Dressing  Independent  Toileting  Independent  Feeding  Independent  Med Admin  Assisted  Med Delivery   whole    Wound Care Documentation and Therapy:  Incision 09/17/14 Foot Left (Active)   Number of days: 9415        Elimination:  Continence:   · Bowel: Yes  · Bladder: Yes  Urinary Catheter: None   Colostomy/Ileostomy/Ileal Conduit: No       Date of Last BM: 9/16/2021    Intake/Output Summary (Last 24 hours) at 9/16/2021 1307  Last data filed at 9/15/2021 2044  Gross per 24 hour   Intake 300 ml   Output --   Net 300 ml     I/O last 3 completed shifts: In: 300 [P.O.:300]  Out: -     Safety Concerns:     History of Falls (last 30 days)    Impairments/Disabilities:      None    Nutrition Therapy:  Current Nutrition Therapy:   - Oral Diet:  Carb Control 4 carbs/meal (1800kcals/day)    Routes of Feeding: Oral  Liquids: Thin Liquids  Daily Fluid Restriction: no  Last Modified Barium Swallow with Video (Video Swallowing Test): not done    Treatments at the Time of Hospital Discharge:   Respiratory Treatments: ***  Oxygen Therapy:  is not on home oxygen therapy. Ventilator:    - No ventilator support    Rehab Therapies: Physical Therapy, Occupational Therapy and Nurse  Diabetes education and support with focus on medication and hypo/hyperglcyemia prevention. Recent hypoglycemia requiring emergency care. Plan for insulin administration to be managed by Cheryle Christians (girlfriend). Decreased insulin and oral strategies.   Recommend BG targets

## 2021-09-16 NOTE — PROGRESS NOTES
Physical Therapy  Facility/Department: [address] MED SURG  Daily Treatment Note  NAME: Sujata Khoury  : 1955  MRN: [id number]    Date of Service: 2021    Discharge Recommendations:  24 hour supervision or assist, Patient would benefit from continued therapy after discharge, S Level 1, Home with Home health PT      Assessment   Assessment: Pt demonstrating improvement with mobility. Pt ambulating with SBA without device. Pt continues to demonstrate some confusion and decreased cognition however, unclear on baseline. Will continue to progress mobility as pt tolerates. Anticipate pt will be safe to return home with 24hr supervision and home PT. Treatment Diagnosis: impaired functional mobility  Prognosis: Good  PT Education: PT Role;General Safety; Functional Mobility Training;Transfer Training  REQUIRES PT FOLLOW UP: Yes  Activity Tolerance  Activity Tolerance: Patient Tolerated treatment well;Patient limited by cognitive status     Patient Diagnosis(es): The encounter diagnosis was Hypoglycemia. has a past medical history of Anxiety, Arthritis, CAD (coronary artery disease), Cancer ([address] Depression, Dermatitis, Diabetes mellitus ([address] Diabetic neuropathy ([address] GERD (gastroesophageal reflux disease), Giardia, Hemorrhoids, Hyperlipidemia, Hypertension, and Migraine. has a past surgical history that includes Bladder surgery; Knee arthroscopy (Right); eye surgery; and arthrodesis (Left, 14). Restrictions  Restrictions/Precautions  Restrictions/Precautions: Fall Risk  Position Activity Restriction  Other position/activity restrictions: h/o of low BS; IV  Subjective   General  Chart Reviewed: Yes  Additional Pertinent Hx: Per ED note of Severo Montez MD on 9-: The pt is a 60 yo male who came to the ED via squad for hypoglycemia; BS at 38 at home and the pt presented with confusion and decreased responsiveness.  The pt just in the ED the day prior for the same thing and sent home with [facility] but the pt came back to ED prior to them coming to the house. The pt's girlfriend reports she can no longer care for him at home at this time. PMHx: anxiety, arth, CAD, skin Ca, depression, DM, diabetic neuropathy, HTN, migraines, R knee scope, Arthrodesis 4 th proximal interphalangeal joint left foot  Family / Caregiver Present: Yes (girlfriend)  Referring Practitioner: Junior Mon MD  Subjective  Subjective: Pt agreeable to therapy. Supine in bed upon arrival. Pt is confused, delayed processing and difficulty following commands. General Comment  Comments: Pt's girlfriend reports he was supposed to see a neurologist yesterday for testing related to dementia/alzheimer's. Objective   Bed mobility  Supine to Sit: Stand by assistance  Sit to Supine: Unable to assess  Scooting: Stand by assistance  Transfers  Sit to Stand: Contact guard assistance (from EOB)  Stand to sit: Contact guard assistance  Ambulation  Ambulation?: Yes  Ambulation 1  Surface: level tile  Device: No Device  Assistance: Stand by assistance  Quality of Gait: step through gait pattern, fair navneet, steady without loss of balance  Distance: 60'  Stairs/Curb  Stairs?: No     Balance  Sitting - Static: Good  Sitting - Dynamic: Good  Standing - Static: Good  Standing - Dynamic: Good;-  Exercises  Hip Flexion: x15 BLE  Hip Abduction: x15 BLE  Knee Long Arc Quad: x15 BLE  Ankle Pumps: x15 BLE  Comments: max cues for technique and sequencing     AM-PAC Score  AM-PAC Inpatient Mobility Raw Score : 20 (09/16/21 1200)  AM-PAC Inpatient T-Scale Score : 47.67 (09/16/21 1200)  Mobility Inpatient CMS 0-100% Score: 35.83 (09/16/21 1200)  Mobility Inpatient CMS G-Code Modifier : CJ (09/16/21 1200)        Goals  Short term goals  Time Frame for Short term goals: upon d/c  Short term goal 1: Bed mobility with mod I. Short term goal 2: Transfers sit <> stand with SBA.  Goal met 9/16  Short term goal 3: Ambulate without device 100 feet with SBA.  Short term goal 4: Negotiate steps with rail with SBA.   Patient Goals   Patient goals : the pt wants to go home tomorrow    Plan    Plan  Times per week: 2-3x/week  Current Treatment Recommendations: Functional Mobility Training, Transfer Training, Gait Training, Stair training, Strengthening, Safety Education & Training, Patient/Caregiver Education & Training  Safety Devices  Type of devices: Call light within reach, Chair alarm in place, Gait belt, Patient at risk for falls, Left in chair, Nurse notified     Therapy Time   Individual Concurrent Group Co-treatment   Time In [phone]         Time Out 1155         Minutes 23         Timed Code Treatment Minutes: [address],   [facility] Detail Level: Generalized Include Location In Plan?: Yes Hide Include Location In Plan Question?: No

## 2021-09-16 NOTE — DISCHARGE SUMMARY
Hospitalist Discharge Summary    Patient ID:  Jenn Sanchez  [id number]  82 y.o.  1955    Admit date: 9/15/2021    Discharge date: 9/16/2021    Disposition: Home with home care    Admission Diagnoses:   Patient Active Problem List   Diagnosis    Curry    Diabetes mellitus type 2, controlled [address]    Hypertension    Smoking greater than 30 pack years    Obesity (BMI 30.0-34. 9)    Atopic rhinitis    Benign neoplasm of colon    Benign neoplasm of skin    Tuberculosis of unspecified bones and joints, confirmation unspecified    Dermatitis factitia    Gastroesophageal reflux disease    Essential hypertension    Lipoma of face    Migraine    Pain in extremity    Pruritic rash    Pure hypercholesterolemia    Diastasis recti    Hypoglycemia       Discharge Diagnoses: Active Problems:    Hypoglycemia  Resolved Problems:    * No resolved hospital problems. *      Code Status:  Full Code    Condition:  Stable    Discharge Diet: Diet:  ADULT DIET; Regular; 4 carb choices (60 gm/meal)    PCP to do list: Follow for improvement in symptoms    Hospital Course: 62 yo M with history of Diabetes mellitus presents from home due to altered mental status. Pt unable to provide any significant history due to condition. Apparently per the girlfriend pt ate very little and took whole dose of his insulin. Blood sugar was noted to be in 30s. EMS was called and pt was brought to the hospital    Following problems were addressed during his stay    Acute encephalopathy 2/2 hypoglycemia  -Patient insulin regimen was held. He was started on D5 drip. His blood sugar remained stable and he was ultimately taken off the D5 drip. Counseled patient and his girlfriend extensively about the importance of checking his blood sugars and to not take excessive doses of his insulins. He will be discharged on Metformin and Lantus 20 units at night.   They will follow up with his PCP soon for further titration of his diabetes regimen  -Diabetic education was also provided by the diabetic educator     HTN  -continue lisinopril, coreg, hydralazine, norvasc     Coronary artery disease  -continue aspirin, lipitor     Dementia  -continue Aricept    Discharge Medications:   Current Discharge Medication List        Current Discharge Medication List      CONTINUE these medications which have CHANGED    Details   metFORMIN (GLUCOPHAGE) 1000 MG tablet Take 1 tablet by mouth 2 times daily (with meals)  Qty: 60 tablet, Refills: 3      insulin glargine (LANTUS) 100 UNIT/ML injection vial Inject 20 Units into the skin nightly Inject 70 units subcutaneously in the morning, and 60 units in the evening  Qty: 10 mL, Refills: 3           Current Discharge Medication List      CONTINUE these medications which have NOT CHANGED    Details   atorvastatin (LIPITOR) 40 MG tablet Take 40 mg by mouth daily      dicyclomine (BENTYL) 20 MG tablet Take 20 mg by mouth every 6 hours      donepezil (ARICEPT) 5 MG tablet Take 5 mg by mouth nightly      omeprazole (PRILOSEC) 20 MG delayed release capsule Take 20 mg by mouth daily      topiramate (TOPAMAX) 100 MG tablet Take 100 mg by mouth 2 times daily      vitamin D (CHOLECALCIFEROL) 25 MCG (1000 UT) TABS tablet Take 2,000 Units by mouth daily      hydrOXYzine (VISTARIL) 25 MG capsule Take 25 mg by mouth nightly as needed (sleep)      vitamin B-12 (CYANOCOBALAMIN) 1000 MCG tablet Take 2,000 mcg by mouth daily      Riboflavin 400 MG TABS Take 400 mg by mouth daily      methocarbamol (ROBAXIN) 750 MG tablet Take 750 mg by mouth 2 times daily as needed (pain)       DULoxetine (CYMBALTA) 60 MG extended release capsule Take 60 mg by mouth daily       carvedilol (COREG) 25 MG tablet Take 25 mg by mouth 2 times daily       lisinopril (PRINIVIL;ZESTRIL) 40 MG tablet Take 40 mg by mouth daily       amLODIPine (NORVASC) 10 MG tablet Take 10 mg by mouth daily       hydrALAZINE (APRESOLINE) 50 MG tablet Take 50 mg by mouth 3 times daily       Multiple Vitamins-Minerals (MULTIVITAMIN PO) Take 1 capsule by mouth daily            Current Discharge Medication List      STOP taking these medications       insulin lispro (ADMELOG) 100 UNIT/ML injection vial Comments:   Reason for Stopping:         Lixisenatide (ADLYXIN) 20 MCG/0.2ML SOPN Comments:   Reason for Stopping:         furosemide (LASIX) 40 MG tablet Comments:   Reason for Stopping:         Omega-3 Fatty Acids (FISH OIL) 1200 MG CAPS Comments:   Reason for Stopping:         hydrocortisone (ANUSOL-HC) 25 MG suppository Comments:   Reason for Stopping:         folic acid-pyridoxine-cyanocobalamine (FOLTX) 2.5-25-1 MG TABS tablet Comments:   Reason for Stopping:         sitaGLIPtin (JANUVIA) 100 MG tablet Comments:   Reason for Stopping:         insulin aspart (NOVOLOG) 100 UNIT/ML injection vial Comments:   Reason for Stopping:         naproxen (NAPROSYN) 500 MG tablet Comments:   Reason for Stopping:         simvastatin (ZOCOR) 40 MG tablet Comments:   Reason for Stopping:         fenofibrate 160 MG tablet Comments:   Reason for Stopping:                   Procedures: none    Assessment on Discharge: Stable, improved     Discharge ROS:  A complete review of systems was asked and negative except for none    Discharge Exam:  BP (!) 149/82   Pulse 63   Temp 98 °F (36.7 °C) (Oral)   Resp 16   Ht 6' 2\" (1.88 m)   Wt 256 lb 13.4 oz (116.5 kg)   SpO2 98%   BMI 32.98 kg/m²     Gen: NAD  HEENT: NC/AT, moist mucous membranes, no oropharyngeal erythema or exudate  Neck: supple, trachea midline, no anterior cervical or SC LAD  Heart:  Normal s1/s2, RRR, no murmurs, gallops, or rubs.  no leg edema  Lungs:  CTA bilaterally, no wheeze,no rales or rhonchi, no use of accessory muscles  Abd: bowel sounds present, soft, nontender, nondistended, no masses  Extrem:  No clubbing, cyanosis,  no edema  Skin: no lesion or masses  Psych:  A & O x3  Neuro: grossly intact, moves all four extremities    Pertinent Studies During Hospital Stay:  Radiology:  CT HEAD WO CONTRAST    Result Date: 9/15/2021  EXAMINATION: CT OF THE HEAD WITHOUT CONTRAST 9/15/2021 4:04 am TECHNIQUE: CT of the head was performed without the administration of intravenous contrast. Dose modulation, iterative reconstruction, and/or weight based adjustment of the mA/kV was utilized to reduce the radiation dose to as low as reasonably achievable. COMPARISON: Head CT 09/14/2021 HISTORY: ORDERING SYSTEM PROVIDED HISTORY: AMS TECHNOLOGIST PROVIDED HISTORY: Has a \"code stroke\" or \"stroke alert\" been called? ->No Reason for exam:->AMS Decision Support Exception - unselect if not a suspected or confirmed emergency medical condition->Emergency Medical Condition (MA) Reason for Exam: ams FINDINGS: BRAIN/VENTRICLES:  No masses nor acute intracranial hemorrhage. Intact gray/white matter differentiation without findings of acute ischemia. No mass effect nor midline shift. Patent basilar cisterns and foramen magnum. No hydrocephalus. Moderate cerebral and minimal cerebellar atrophy. Minimal deep and periventricular white matter hypodensities likely due to chronic small vessel ischemia. Old lacunar infarct in the right basal ganglia. ORBITS:  Normal without acute abnormality. SINUSES:  Mild mucoperiosteal thickening in the hypoplastic right sphenoid sinus. Visualized portions otherwise appear normally pneumatized and aerated. SOFT TISSUES/SKULL:  No acute soft tissue abnormality. Moderate atherosclerotic calcifications. No acute fracture. No acute intracranial abnormality.      CT HEAD WO CONTRAST    Result Date: 9/14/2021  EXAMINATION: CT OF THE HEAD WITHOUT CONTRAST  9/14/2021 10:40 am TECHNIQUE: CT of the head was performed without the administration of intravenous contrast. Dose modulation, iterative reconstruction, and/or weight based adjustment of the mA/kV was utilized to reduce the radiation dose to as low as reasonably achievable. COMPARISON: None HISTORY: ORDERING SYSTEM PROVIDED HISTORY: altered mental status TECHNOLOGIST PROVIDED HISTORY: Has a \"code stroke\" or \"stroke alert\" been called? ->No Reason for exam:->altered mental status Decision Support Exception - unselect if not a suspected or confirmed emergency medical condition->Emergency Medical Condition (MA) Reason for Exam: trauma AMS, fall Acuity: Acute Type of Exam: Initial FINDINGS: BRAIN/VENTRICLES: Motion/streak artifact is present on the examination. There is mild prominence of the ventricular system. No evidence of mass effect or midline shift. Prominence of sulci overlying convexities of cerebral hemispheres consistent with changes of atrophy. Subtle foci of low attenuation within periventricular/subcortical white matter is nonspecific. Finding may represent changes of minimal ischemic leukoencephalopathy. Tiny focus of low attenuation in the right centrosylvian region (image 37) may represent prominent perivascular space versus tiny remote lacunar infarct. No abnormal extra-axial fluid collection is identified. There is atherosclerotic calcification of distal internal carotid and left vertebral arteries. ORBITS: The visualized portion of the orbits demonstrate no acute abnormality. SINUSES: The visualized paranasal sinuses and mastoid air cells demonstrate no acute abnormality. Minimal mucosal thickening identified within few ethmoid air cells. SOFT TISSUES/SKULL:  6 mm rounded area of low attenuation in subcutaneous soft tissues in the left upper cervical region (image 1) could represent a tiny sebaceous cyst.  Minimal induration of subcutaneous soft tissues in the high posterior parietal region (image 61) and left frontal region (image 51) could represent changes related to prior trauma. No underlying skull fracture is identified. No evidence of acute intracranial abnormality.      CT CERVICAL SPINE WO CONTRAST    Result Date: 9/14/2021  EXAMINATION: CT OF THE CERVICAL SPINE WITHOUT CONTRAST 9/14/2021 10:40 am TECHNIQUE: CT of the cervical spine was performed without the administration of intravenous contrast. Multiplanar reformatted images are provided for review. Dose modulation, iterative reconstruction, and/or weight based adjustment of the mA/kV was utilized to reduce the radiation dose to as low as reasonably achievable. COMPARISON: None. HISTORY: ORDERING SYSTEM PROVIDED HISTORY: trauma AMS TECHNOLOGIST PROVIDED HISTORY: Reason for exam:->trauma AMS Decision Support Exception - unselect if not a suspected or confirmed emergency medical condition->Emergency Medical Condition (MA) FINDINGS: BONES/ALIGNMENT: There is no acute fracture or traumatic malalignment. DEGENERATIVE CHANGES: Multilevel degenerative changes. SOFT TISSUES: There is no prevertebral soft tissue swelling. No acute abnormality of the cervical spine. XR CHEST PORTABLE    Result Date: 9/15/2021  EXAMINATION: ONE XRAY VIEW OF THE CHEST 9/15/2021 3:55 am COMPARISON: Chest radiograph 09/14/2021 HISTORY: ORDERING SYSTEM PROVIDED HISTORY: SOB TECHNOLOGIST PROVIDED HISTORY: Reason for exam:->SOB Reason for Exam: sob FINDINGS: Incomplete lung aeration. Bibasilar airspace opacities. Diffuse interstitial prominence with indistinct pulmonary vasculature but no definite interlobular septal thickening. No definite findings of pneumothorax or pleural effusion. Mediastinal prominence due to aortic tortuosity. Mildly prominent hilar and cardiac contours. Atherosclerotic calcification in the aorta. Calcified mediastinal and right hilar lymph nodes, likely sequelae of granulomatous disease. No acute fracture. 1. Bibasilar airspace opacities most likely due to atelectasis, although pneumonia could appear similar. 2. Pulmonary vascular congestion and mild cardiomegaly.      XR CHEST PORTABLE    Result Date: 9/14/2021  EXAMINATION: ONE XRAY VIEW OF THE CHEST 9/14/2021 10:41 am COMPARISON: Chest x-ray dated 02/13/2016. HISTORY: ORDERING SYSTEM PROVIDED HISTORY: altered mental TECHNOLOGIST PROVIDED HISTORY: Reason for exam:->altered mental Reason for Exam: ams Acuity: Acute Type of Exam: Initial FINDINGS: HEART/MEDIASTINUM: The cardiomediastinal silhouette is stable. PLEURA/LUNGS: There are no focal consolidations or pleural effusions. There is no appreciable pneumothorax. BONES/SOFT TISSUE: No acute abnormality. No radiographic evidence of acute pulmonary disease.            Last Labs on Discharge:     Recent Results (from the past 24 hour(s))   POCT Glucose    Collection Time: 09/15/21  4:42 PM   Result Value Ref Range    POC Glucose 172 (H) 70 - 99 mg/dl    Performed on ACCU-CHEK    POCT Glucose    Collection Time: 09/15/21  8:44 PM   Result Value Ref Range    POC Glucose 189 (H) 70 - 99 mg/dl    Performed on ACCU-CHEK    Basic Metabolic Panel w/ Reflex to MG    Collection Time: 09/16/21  5:47 AM   Result Value Ref Range    Sodium 142 136 - 145 mmol/L    Potassium reflex Magnesium 3.9 3.5 - 5.1 mmol/L    Chloride 111 (H) 99 - 110 mmol/L    CO2 21 21 - 32 mmol/L    Anion Gap 10 3 - 16    Glucose 147 (H) 70 - 99 mg/dL    BUN 9 7 - 20 mg/dL    CREATININE 0.7 (L) 0.8 - 1.3 mg/dL    GFR Non-African American >60 >60    GFR African American >60 >60    Calcium 8.9 8.3 - 10.6 mg/dL   CBC auto differential    Collection Time: 09/16/21  5:48 AM   Result Value Ref Range    WBC 10.7 4.0 - 11.0 K/uL    RBC 4.67 4.20 - 5.90 M/uL    Hemoglobin 14.1 13.5 - 17.5 g/dL    Hematocrit 42.7 40.5 - 52.5 %    MCV 91.4 80.0 - 100.0 fL    MCH 30.1 26.0 - 34.0 pg    MCHC 33.0 31.0 - 36.0 g/dL    RDW 15.1 12.4 - 15.4 %    Platelets 435 763 - 679 K/uL    MPV 9.6 5.0 - 10.5 fL    PLATELET SLIDE REVIEW Adequate     SLIDE REVIEW see below     Neutrophils % 61.7 %    Lymphocytes % 25.1 %    Monocytes % 9.6 %    Eosinophils % 2.8 %    Basophils % 0.8 %    Neutrophils Absolute 6.6 1.7 - 7.7 K/uL    Lymphocytes Absolute 2.7 1.0 - 5.1 K/uL

## 2021-09-16 NOTE — ACP (ADVANCE CARE PLANNING)
Advance Care Planning     Advance Care Planning Activator (Inpatient)  Conversation Note      Date of ACP Conversation: 9/16/2021     Conversation Conducted with: Patient with Decision Making Capacity    ACP Activator: Jeferson Riddle RN    Health Care Decision Maker:     Current Designated Health Care Decision Maker:     Primary Decision Maker: Bautista Hodges Other - [phone]    Care Preferences    Ventilation: \"If you were in your present state of health and suddenly became very ill and were unable to breathe on your own, what would your preference be about the use of a ventilator (breathing machine) if it were available to you? \"      Would the patient desire the use of ventilator (breathing machine)?: yes    \"If your health worsens and it becomes clear that your chance of recovery is unlikely, what would your preference be about the use of a ventilator (breathing machine) if it were available to you? \"     Would the patient desire the use of ventilator (breathing machine)?: No      Resuscitation  \"CPR works best to restart the heart when there is a sudden event, like a heart attack, in someone who is otherwise healthy. Unfortunately, CPR does not typically restart the heart for people who have serious health conditions or who are very sick. \"    \"In the event your heart stopped as a result of an underlying serious health condition, would you want attempts to be made to restart your heart (answer \"yes\" for attempt to resuscitate) or would you prefer a natural death (answer \"no\" for do not attempt to resuscitate)? \" yes       [] Yes   [x] No   Educated Patient / Modesto mAbriz regarding differences between Advance Directives and portable DNR orders.     Length of ACP Conversation in minutes:  5    Conversation Outcomes:  [] ACP discussion completed  [] Existing advance directive reviewed with patient; no changes to patient's previously recorded wishes  [] New Advance Directive completed  [] Portable Do Not Rescitate prepared for Provider review and signature  [] POLST/POST/MOLST/MOST prepared for Provider review and signature      Follow-up plan:    [] Schedule follow-up conversation to continue planning  [] Referred individual to Provider for additional questions/concerns   [] Advised patient/agent/surrogate to review completed ACP document and update if needed with changes in condition, patient preferences or care setting    [x] This note routed to one or more involved healthcare providers

## 2021-09-16 NOTE — CARE COORDINATION
[facility]  Received referral from case management   Faxed orders to [address] care to see patient by   9/18/21  Blayne Palacios LPN    [address] [facility]. Cell [phone], Fax [phone]

## 2021-09-16 NOTE — PLAN OF CARE
Problem: Falls - Risk of:  Goal: Will remain free from falls  Description: Will remain free from falls  9/16/2021 1224 by Raffaele Israel RN  Outcome: Ongoing  9/15/2021 2251 by Yeimi Velez RN  Outcome: Ongoing  Goal: Absence of physical injury  Description: Absence of physical injury  9/16/2021 1224 by Raffaele Israel RN  Outcome: Ongoing  9/15/2021 2251 by Yeimi Velez RN  Outcome: Ongoing

## 2021-09-16 NOTE — CARE COORDINATION
[facility]  Diabetes Education   Progress Note       NAME:  Jesus Jose  MEDICAL RECORD NUMBER:  [id number]  AGE: 61 y.o. GENDER: male  : 1955  TODAY'S DATE:  2021    Subjective   Reason for Diabetic Education Evaluation and Assessment: diabetes support, recurrent hypoglycemia     Hypoglycemia after a change in the person administering the insulin at home from Celeste Britton to his girlfriend, Jo Schuler. Jo Schuler reports a decrease in meal intakes and switching from regular Dr. Ayse Gruber to Dr. Marleny Bhardwaj. Visit Type: evaluation      Jesus Jose is a 61 y.o. male referred by:     [] Physician  [] Nursing  [x] Chart Review   [] Other:     PAST MEDICAL HISTORY        Diagnosis Date    Anxiety     Arthritis     CAD (coronary artery disease)     Cancer ([facility] [address]     skin-legs,arms,neck-basal cell eye    Depression     Dermatitis     Diabetes mellitus ([address]     Diabetic neuropathy ([facility] [address]     GERD (gastroesophageal reflux disease)     Giardia     Hemorrhoids     Hyperlipidemia     Hypertension     Migraine        PAST SURGICAL HISTORY    Past Surgical History:   Procedure Laterality Date    ARTHRODESIS Left 14    Arthrodesis 4 th proximal interphalangeal joint left foot    BLADDER SURGERY      EYE SURGERY      right eye-skin cancer removed    KNEE ARTHROSCOPY Right     x4       FAMILY HISTORY    History reviewed. No pertinent family history. SOCIAL HISTORY    Social History     Tobacco Use    Smoking status: Current Every Day Smoker     Packs/day: 1.50     Years: 40.00     Pack years: 60.00     Types: Cigarettes    Smokeless tobacco: Never Used   Substance Use Topics    Alcohol use:  Yes     Alcohol/week: 0.0 standard drinks     Comment: occas    Drug use: No       ALLERGIES    Allergies   Allergen Reactions    Codeine Nausea Only       MEDICATIONS     insulin lispro  0-12 Units SubCUTAneous TID     insulin lispro  0-6 Units SubCUTAneous Nightly    sodium chloride flush  10 mL IntraVENous 2 times per day    enoxaparin  40 mg SubCUTAneous Daily    amLODIPine  10 mg Oral Daily    carvedilol  25 mg Oral BID WC    DULoxetine  60 mg Oral Daily    hydrALAZINE  50 mg Oral 3 times per day    lisinopril  40 mg Oral Daily    atorvastatin  40 mg Oral Daily    dicyclomine  20 mg Oral Q6H    donepezil  5 mg Oral Nightly    pantoprazole  40 mg Oral QAM AC    topiramate  100 mg Oral BID    Vitamin D  2,000 Units Oral Daily    vitamin B-12  2,000 mcg Oral Daily    vitamin B-2  400 mg Oral Daily       Objective        Patient Active Problem List   Diagnosis Code    Curry M20.40    Diabetes mellitus type 2, controlled ([facility] [address] E11.9    Hypertension I10    Smoking greater than 30 pack years F17.210    Obesity (BMI 30.0-34. 9) E66.9    Atopic rhinitis J30.9    Benign neoplasm of colon D12.6    Benign neoplasm of skin D23.9    Tuberculosis of unspecified bones and joints, confirmation unspecified [id number]    Dermatitis factitia L98.1    Gastroesophageal reflux disease K21.9    Essential hypertension I10    Lipoma of face D17.0    Migraine G43.909    Pain in extremity M79.609    Pruritic rash L28.2    Pure hypercholesterolemia E78.00    Diastasis recti M62.08    Hypoglycemia E16.2        BP (!) 169/79   Pulse 56   Temp 98.1 °F (36.7 °C) (Oral)   Resp 16   Ht 6' 2\" (1.88 m)   Wt 256 lb 13.4 oz (116.5 kg)   SpO2 99%   BMI 32.98 kg/m²     HgBA1c:    Lab Results   Component Value Date    LABA1C 8.4 08/04/2011       Recent Labs     09/15/21  1642 09/15/21  2044 09/16/21  0737 09/16/21  1111   POCGLU 172* 189* 140* 199*       BUN/Creatinine:    Lab Results   Component Value Date    BUN 9 09/16/2021    CREATININE 0.7 09/16/2021       Assessment        Diabetes Management and Education    Does the patient have a Primary Care Physician? Yes, Dr. Araceli Garcia     Does the patient require new medication instruction?  Yes  Reinforced insulin pen administration steps.        Person responsible for administration of Insulin/Medication:        [] Self     [] Caregiver       [] Spouse       [] Other Family Member   [x]  Other:  Girlfriend     Insulin Instruction:  insulin pen  Injection Site:   [x] location    [x] rotation     Level of patient/caregiver understanding able to:       [x] Verbalized Understanding   [] Demonstrated Understanding       [] Teach Back       [] Needs Reinforcement     []  Other:        Does the patient/caregiver monitor Blood Glucoses? Yes  Reviewed glycemic control targets, testing frequency and when to call PCP. Level of patient/caregiver understanding able to:        [x] Verbalized Understanding   [] Demonstrated Understanding       [] Teach Back       [] Needs Reinforcement     []  Other:        Does the patient/caregiver follow a Meal Plan? No: Deliliah Councilman is able to describe the plate method for consistent carb intake. Hai Go prefers Dr. Syeda Kelley but is switching to Dr. Manju Schumacher. Recommend making water, unsweetened tea or coffee primary drinks. Reviewed importance of eating three meals per day and plate method for consistent carb intake. Level of patient/caregiver understanding able to:       [x] Verbalized Understanding   [] Demonstrated Understanding       [] Teach Back       [] Needs Reinforcement     []  Other:      Does the patient/caregiver understand S/S of Hypoglycemia? Yes. Praised for recognizing low blood sugar. Reviewed symptoms, prevention and treatment. Level of patient/caregiver understanding able to:       [x] Verbalized Understanding   [] Demonstrated Understanding       [] Teach Back       [] Needs Reinforcement     []  Other:                    Does the patient/caregiver understand S/S of Hyperglycemia? No:     Reviewed symptoms, prevention and treatment.     Level of patient/caregiver understanding able to:        [x] Verbalized Understanding   [] Demonstrated Understanding       [] Teach Back       [] Needs Reinforcement     []  Other:           Plan        Ongoing diabetes education and blood glucose monitoring. Recommend BG targets 100 - 180 for home. Recommend simplifying the insulin plan with home care support. Discussed with Sierra Vaughan MD                                            Discharge Plan:  Placement for patient upon discharge: home with support   Recommend home care for medication management and hyper/hypoglycemia treatment and prevention.        Teaching Time Diabetes Education:  30 minutes     Electronically signed by Chelsy Hdz on 9/16/2021 at 12:38 PM

## 2023-01-11 ENCOUNTER — APPOINTMENT (OUTPATIENT)
Dept: CT IMAGING | Age: 68
End: 2023-01-11
Payer: MEDICARE

## 2023-01-11 ENCOUNTER — HOSPITAL ENCOUNTER (EMERGENCY)
Age: 68
Discharge: HOME | End: 2023-01-12
Attending: STUDENT IN AN ORGANIZED HEALTH CARE EDUCATION/TRAINING PROGRAM
Payer: MEDICARE

## 2023-01-11 DIAGNOSIS — S09.90XA INJURY OF HEAD, INITIAL ENCOUNTER: Primary | ICD-10-CM

## 2023-01-11 PROCEDURE — 70450 CT HEAD/BRAIN W/O DYE: CPT

## 2023-01-11 PROCEDURE — 99284 EMERGENCY DEPT VISIT MOD MDM: CPT

## 2023-01-11 ASSESSMENT — PAIN - FUNCTIONAL ASSESSMENT: PAIN_FUNCTIONAL_ASSESSMENT: NONE - DENIES PAIN

## 2023-01-12 VITALS
OXYGEN SATURATION: 97 % | SYSTOLIC BLOOD PRESSURE: 133 MMHG | HEART RATE: 72 BPM | TEMPERATURE: 98.3 F | BODY MASS INDEX: 32.05 KG/M2 | RESPIRATION RATE: 16 BRPM | HEIGHT: 74 IN | WEIGHT: 249.7 LBS | DIASTOLIC BLOOD PRESSURE: 69 MMHG
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
## 2023-12-30 ENCOUNTER — APPOINTMENT (OUTPATIENT)
Dept: GENERAL RADIOLOGY | Age: 68
DRG: 522 | End: 2023-12-30
Payer: MEDICARE

## 2023-12-30 ENCOUNTER — APPOINTMENT (OUTPATIENT)
Dept: CT IMAGING | Age: 68
DRG: 522 | End: 2023-12-30
Payer: MEDICARE

## 2023-12-30 ENCOUNTER — HOSPITAL ENCOUNTER (INPATIENT)
Age: 68
LOS: 9 days | Discharge: HOME | DRG: 522 | End: 2024-01-08
Attending: EMERGENCY MEDICINE | Admitting: STUDENT IN AN ORGANIZED HEALTH CARE EDUCATION/TRAINING PROGRAM
Payer: MEDICARE

## 2023-12-30 DIAGNOSIS — S72.001A CLOSED FRACTURE OF NECK OF RIGHT FEMUR, INITIAL ENCOUNTER (HCC): Primary | ICD-10-CM
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
## 2023-12-30 LAB
ABO + RH BLD: NORMAL
ANION GAP SERPL CALCULATED.3IONS-SCNC: 18 MMOL/L (ref 3–16)
BASOPHILS # BLD: 0.1 K/UL (ref 0–0.2)
BASOPHILS NFR BLD: 0.6 %
BLD GP AB SCN SERPL QL: NORMAL
BUN SERPL-MCNC: 19 MG/DL (ref 7–20)
CALCIUM SERPL-MCNC: 9.7 MG/DL (ref 8.3–10.6)
CHLORIDE SERPL-SCNC: 100 MMOL/L (ref 99–110)
CO2 SERPL-SCNC: 20 MMOL/L (ref 21–32)
CREAT SERPL-MCNC: 0.9 MG/DL (ref 0.8–1.3)
DEPRECATED RDW RBC AUTO: 14.2 % (ref 12.4–15.4)
EOSINOPHIL # BLD: 0.1 K/UL (ref 0–0.6)
EOSINOPHIL NFR BLD: 0.3 %
GFR SERPLBLD CREATININE-BSD FMLA CKD-EPI: >60 ML/MIN/{1.73_M2}
GLUCOSE BLD-MCNC: 314 MG/DL (ref 70–99)
GLUCOSE SERPL-MCNC: 214 MG/DL (ref 70–99)
HCT VFR BLD AUTO: 40.5 % (ref 40.5–52.5)
HGB BLD-MCNC: 13.5 G/DL (ref 13.5–17.5)
INR PPP: 1.24 (ref 0.84–1.16)
LYMPHOCYTES # BLD: 1.4 K/UL (ref 1–5.1)
LYMPHOCYTES NFR BLD: 7.7 %
MCH RBC QN AUTO: 29.8 PG (ref 26–34)
MCHC RBC AUTO-ENTMCNC: 33.3 G/DL (ref 31–36)
MCV RBC AUTO: 89.6 FL (ref 80–100)
MONOCYTES # BLD: 1 K/UL (ref 0–1.3)
MONOCYTES NFR BLD: 5.5 %
NEUTROPHILS # BLD: 15.2 K/UL (ref 1.7–7.7)
NEUTROPHILS NFR BLD: 85.9 %
PERFORMED ON: ABNORMAL
PLATELET # BLD AUTO: 213 K/UL (ref 135–450)
PMV BLD AUTO: 9.3 FL (ref 5–10.5)
POTASSIUM SERPL-SCNC: 4.3 MMOL/L (ref 3.5–5.1)
PROTHROMBIN TIME: 15.6 SEC (ref 11.5–14.8)
RBC # BLD AUTO: 4.52 M/UL (ref 4.2–5.9)
SODIUM SERPL-SCNC: 138 MMOL/L (ref 136–145)
WBC # BLD AUTO: 17.7 K/UL (ref 4–11)

## 2023-12-30 PROCEDURE — 73070 X-RAY EXAM OF ELBOW: CPT

## 2023-12-30 PROCEDURE — 73560 X-RAY EXAM OF KNEE 1 OR 2: CPT

## 2023-12-30 PROCEDURE — 85025 COMPLETE CBC W/AUTO DIFF WBC: CPT

## 2023-12-30 PROCEDURE — 73552 X-RAY EXAM OF FEMUR 2/>: CPT

## 2023-12-30 PROCEDURE — 85610 PROTHROMBIN TIME: CPT

## 2023-12-30 PROCEDURE — 73562 X-RAY EXAM OF KNEE 3: CPT

## 2023-12-30 PROCEDURE — 99285 EMERGENCY DEPT VISIT HI MDM: CPT

## 2023-12-30 PROCEDURE — 1200000000 HC SEMI PRIVATE

## 2023-12-30 PROCEDURE — 80048 BASIC METABOLIC PNL TOTAL CA: CPT

## 2023-12-30 PROCEDURE — 86900 BLOOD TYPING SEROLOGIC ABO: CPT

## 2023-12-30 PROCEDURE — 86850 RBC ANTIBODY SCREEN: CPT

## 2023-12-30 PROCEDURE — 6370000000 HC RX 637 (ALT 250 FOR IP): Performed by: STUDENT IN AN ORGANIZED HEALTH CARE EDUCATION/TRAINING PROGRAM

## 2023-12-30 PROCEDURE — 73521 X-RAY EXAM HIPS BI 2 VIEWS: CPT

## 2023-12-30 PROCEDURE — 86901 BLOOD TYPING SEROLOGIC RH(D): CPT

## 2023-12-30 PROCEDURE — 70450 CT HEAD/BRAIN W/O DYE: CPT

## 2023-12-30 PROCEDURE — 2580000003 HC RX 258: Performed by: STUDENT IN AN ORGANIZED HEALTH CARE EDUCATION/TRAINING PROGRAM

## 2023-12-30 RX ORDER — DONEPEZIL HYDROCHLORIDE 5 MG/1
5 TABLET, FILM COATED ORAL NIGHTLY
Status: DISCONTINUED | OUTPATIENT
Start: 2023-12-30 | End: 2023-12-31

## 2023-12-30 RX ORDER — CARVEDILOL 25 MG/1
25 TABLET ORAL 2 TIMES DAILY
Status: DISCONTINUED | OUTPATIENT
Start: 2023-12-30 | End: 2024-01-08 | Stop reason: HOSPADM

## 2023-12-30 RX ORDER — PANTOPRAZOLE SODIUM 40 MG/1
40 TABLET, DELAYED RELEASE ORAL
Status: DISCONTINUED | OUTPATIENT
Start: 2023-12-31 | End: 2024-01-08 | Stop reason: HOSPADM

## 2023-12-30 RX ORDER — TOPIRAMATE 100 MG/1
100 TABLET, FILM COATED ORAL 2 TIMES DAILY
Status: DISCONTINUED | OUTPATIENT
Start: 2023-12-30 | End: 2023-12-31

## 2023-12-30 RX ORDER — AMLODIPINE BESYLATE 10 MG/1
10 TABLET ORAL DAILY
Status: DISCONTINUED | OUTPATIENT
Start: 2023-12-31 | End: 2024-01-08 | Stop reason: HOSPADM

## 2023-12-30 RX ORDER — ENOXAPARIN SODIUM 100 MG/ML
40 INJECTION SUBCUTANEOUS DAILY
Status: DISCONTINUED | OUTPATIENT
Start: 2023-12-31 | End: 2023-12-31

## 2023-12-30 RX ORDER — DULOXETIN HYDROCHLORIDE 60 MG/1
60 CAPSULE, DELAYED RELEASE ORAL DAILY
Status: DISCONTINUED | OUTPATIENT
Start: 2023-12-31 | End: 2024-01-08 | Stop reason: HOSPADM

## 2023-12-30 RX ORDER — DEXTROSE MONOHYDRATE 100 MG/ML
INJECTION, SOLUTION INTRAVENOUS CONTINUOUS PRN
Status: DISCONTINUED | OUTPATIENT
Start: 2023-12-30 | End: 2024-01-08 | Stop reason: HOSPADM

## 2023-12-30 RX ORDER — SODIUM CHLORIDE 9 MG/ML
INJECTION, SOLUTION INTRAVENOUS PRN
Status: DISCONTINUED | OUTPATIENT
Start: 2023-12-30 | End: 2024-01-08 | Stop reason: HOSPADM

## 2023-12-30 RX ORDER — GLUCAGON 1 MG/ML
1 KIT INJECTION PRN
Status: DISCONTINUED | OUTPATIENT
Start: 2023-12-30 | End: 2024-01-08 | Stop reason: HOSPADM

## 2023-12-30 RX ORDER — MAGNESIUM SULFATE IN WATER 40 MG/ML
2000 INJECTION, SOLUTION INTRAVENOUS PRN
Status: DISCONTINUED | OUTPATIENT
Start: 2023-12-30 | End: 2024-01-08 | Stop reason: HOSPADM

## 2023-12-30 RX ORDER — LISINOPRIL 40 MG/1
40 TABLET ORAL DAILY
Status: DISCONTINUED | OUTPATIENT
Start: 2023-12-31 | End: 2024-01-08 | Stop reason: HOSPADM

## 2023-12-30 RX ORDER — HYDRALAZINE HYDROCHLORIDE 50 MG/1
50 TABLET, FILM COATED ORAL 3 TIMES DAILY
Status: DISCONTINUED | OUTPATIENT
Start: 2023-12-30 | End: 2023-12-31

## 2023-12-30 RX ORDER — RIBOFLAVIN (VITAMIN B2) 400 MG
400 TABLET ORAL DAILY
Status: DISCONTINUED | OUTPATIENT
Start: 2023-12-30 | End: 2023-12-31

## 2023-12-30 RX ORDER — INSULIN LISPRO 100 [IU]/ML
0-4 INJECTION, SOLUTION INTRAVENOUS; SUBCUTANEOUS NIGHTLY
Status: DISCONTINUED | OUTPATIENT
Start: 2023-12-30 | End: 2024-01-01

## 2023-12-30 RX ORDER — SODIUM CHLORIDE 0.9 % (FLUSH) 0.9 %
5-40 SYRINGE (ML) INJECTION PRN
Status: DISCONTINUED | OUTPATIENT
Start: 2023-12-30 | End: 2024-01-08 | Stop reason: HOSPADM

## 2023-12-30 RX ORDER — SODIUM CHLORIDE 0.9 % (FLUSH) 0.9 %
5-40 SYRINGE (ML) INJECTION EVERY 12 HOURS SCHEDULED
Status: DISCONTINUED | OUTPATIENT
Start: 2023-12-30 | End: 2024-01-08 | Stop reason: HOSPADM

## 2023-12-30 RX ORDER — ATORVASTATIN CALCIUM 40 MG/1
40 TABLET, FILM COATED ORAL DAILY
Status: DISCONTINUED | OUTPATIENT
Start: 2023-12-30 | End: 2024-01-08 | Stop reason: HOSPADM

## 2023-12-30 RX ORDER — POLYETHYLENE GLYCOL 3350 17 G/17G
17 POWDER, FOR SOLUTION ORAL DAILY PRN
Status: DISCONTINUED | OUTPATIENT
Start: 2023-12-30 | End: 2024-01-08 | Stop reason: HOSPADM

## 2023-12-30 RX ORDER — ACETAMINOPHEN 650 MG/1
650 SUPPOSITORY RECTAL EVERY 6 HOURS PRN
Status: DISCONTINUED | OUTPATIENT
Start: 2023-12-30 | End: 2024-01-08 | Stop reason: HOSPADM

## 2023-12-30 RX ORDER — ACETAMINOPHEN 325 MG/1
650 TABLET ORAL EVERY 6 HOURS PRN
Status: DISCONTINUED | OUTPATIENT
Start: 2023-12-30 | End: 2024-01-08 | Stop reason: HOSPADM

## 2023-12-30 RX ORDER — ONDANSETRON 4 MG/1
4 TABLET, ORALLY DISINTEGRATING ORAL EVERY 8 HOURS PRN
Status: DISCONTINUED | OUTPATIENT
Start: 2023-12-30 | End: 2024-01-08 | Stop reason: HOSPADM

## 2023-12-30 RX ORDER — METHOCARBAMOL 750 MG/1
750 TABLET, FILM COATED ORAL 2 TIMES DAILY PRN
Status: DISCONTINUED | OUTPATIENT
Start: 2023-12-30 | End: 2023-12-31

## 2023-12-30 RX ORDER — INSULIN LISPRO 100 [IU]/ML
0-4 INJECTION, SOLUTION INTRAVENOUS; SUBCUTANEOUS
Status: DISCONTINUED | OUTPATIENT
Start: 2023-12-31 | End: 2024-01-01

## 2023-12-30 RX ORDER — INSULIN GLARGINE 100 [IU]/ML
10 INJECTION, SOLUTION SUBCUTANEOUS NIGHTLY
Status: DISCONTINUED | OUTPATIENT
Start: 2023-12-30 | End: 2024-01-01

## 2023-12-30 RX ORDER — POTASSIUM CHLORIDE 20 MEQ/1
40 TABLET, EXTENDED RELEASE ORAL PRN
Status: DISCONTINUED | OUTPATIENT
Start: 2023-12-30 | End: 2024-01-08 | Stop reason: HOSPADM

## 2023-12-30 RX ORDER — ONDANSETRON 2 MG/ML
4 INJECTION INTRAMUSCULAR; INTRAVENOUS EVERY 6 HOURS PRN
Status: DISCONTINUED | OUTPATIENT
Start: 2023-12-30 | End: 2024-01-08 | Stop reason: HOSPADM

## 2023-12-30 RX ORDER — POTASSIUM CHLORIDE 7.45 MG/ML
10 INJECTION INTRAVENOUS PRN
Status: DISCONTINUED | OUTPATIENT
Start: 2023-12-30 | End: 2024-01-08 | Stop reason: HOSPADM

## 2023-12-30 RX ADMIN — SODIUM CHLORIDE, PRESERVATIVE FREE 5 ML: 5 INJECTION INTRAVENOUS at 21:00

## 2023-12-30 RX ADMIN — INSULIN LISPRO 4 UNITS: 100 INJECTION, SOLUTION INTRAVENOUS; SUBCUTANEOUS at 22:48

## 2023-12-30 NOTE — H&P
V2.0  History and Physical      Name:  Alfredo Rosenberg /Age/Sex: 1955  (68 y.o. male)   MRN & CSN:  [id number] & [id number] Encounter Date/Time: 2023 6:25 PM EST   Location:  5522/5522-01 PCP: Wong Guzman MD       Hospital Day: 1      History of Present Illness:     Chief Complaint: fall    Alfredo Rosenberg is a 68 y.o. male with PMH of DM II, HTN, dementia who presents with fall  Unable to obtain any ROS from pt as he was answering yes to all questions.  No family member at bedside. Pt came from nursing facility. No details on how the fall occurred.     At ED, pt was afebrile, hemodynamically stable 's; on room air. Labs unremarkable except for leukocytosis WBC 17.7. Had pan imaging only found to have right femoral neck fracture. Orthopedic surgery was consulted who is planning to take pt to OR tomorrow. Discussed with ED, will admit pt to med surg  for fem neck fracture  workup and management.     Attempted to call contact listed but line is disconnected.   Assessment and Plan:   Displaced transcervical right femoral neck fracture 2/ fall  - ortho consulted  - NPO midnight      Type II diabetes. Hold home oral meds . On latnus 20U QHS  - will do Lantus decreased dose 10U QHS  - hypoglycemia protocol  - LDSSI    Essential HTN. On several home regimen  - resume    Dementia. Pt alert, oriented x0 at baseline. No change.  CT head 2023 noted for Temporal predominant cerebral volume loss   - Resume home meds    Personally reviewed lab studies and imaging.  Imaging that was interpreted personally and results as above.  Drugs that require monitoring for toxicity include lovenox and the method of monitoring was cbc    Disposition:   Current Living situation: nursing home  Expected Disposition: nursing home  Estimated D/C: 2-3 days    Diet ADULT DIET; Regular; 4 carb choices (60 gm/meal)  Diet NPO Exceptions are: Ice Chips, Sips of Water with Meds   DVT Prophylaxis [x] Lovenox, []  Heparin,

## 2023-12-30 NOTE — PLAN OF CARE
Ortho POC    Consult received.  Imaging limited.  I spoke to ER and notably has Hx CVA but his degree of mobility and ambulation and independence is unclear.  I will need to speak with patient or family to determine Hip Miller versus Total Hip.  Full consult to follow tmrw and make decision.   KYLE Ritchie MD

## 2023-12-30 NOTE — ED NOTES
5T notified SBAR is entered and patient will be transported to floor at 1915.     Netta Jarrett, RN  12/30/23 3883    
Medical History:   Diagnosis Date    Anxiety     Arthritis     CAD (coronary artery disease)     Cancer (HCC)     skin-legs,arms,neck-basal cell eye    Depression     Dermatitis     Diabetes mellitus (HCC)     Diabetic neuropathy (HCC)     GERD (gastroesophageal reflux disease)     Giardia     Hemorrhoids     Hyperlipidemia     Hypertension     Migraine        Assessment    Vitals/MEWS:        Vitals:    12/30/23 1521 12/30/23 1522 12/30/23 1710 12/30/23 1723   BP: (!) 166/86   (!) 158/81   Pulse:   82 72   Resp:   16    Temp:  98.4 °F (36.9 °C)     TempSrc:  Oral     SpO2: 94%   95%     FiO2 (%):   O2 Flow Rate:      Cardiac Rhythm:    Pain Assessment: [] Verbal [] Lion Garcia Scale -- Patient only verbalizes pain when moved   Pain Scale:    Last documented pain score (0-10 scale)    Last documented pain medication administered:   Mental Status: disoriented - Patient unable to follow commands, does not verbalize needs.  Orientation Level:    NIH Score:    C-SSRS: Risk of Suicide: No Risk  Bedside swallow:    Jia Coma Scale (GCS): Jia Coma Scale  Eye Opening: Spontaneous  Best Verbal Response: Inappropriate words  Best Motor Response: Localizes pain  [place] Coma Scale Score: 12  Active LDA's:    PO Status: Regular  Pertinent or High Risk Medications/Drips: no   If Yes, please provide details:   Pending Blood Product Administration: no       You may also review the ED PT Care Timeline found under the Summary Nursing Index tab.    Recommendation    Pending orders Admission orders  Plan for Discharge (if known):   Additional Comments: Patient is able to walk at baseline. His mental status is at his baseline per nursing home staff.   If any further questions, please call Sending RN at [phone]    Electronically signed by: Electronically signed by Netta Jarrett RN on 12/30/2023 at 6:35 PM       Netta Jarrett RN  12/30/23 4149
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
## 2023-12-31 LAB
ANION GAP SERPL CALCULATED.3IONS-SCNC: 15 MMOL/L (ref 3–16)
BASOPHILS # BLD: 0.1 K/UL (ref 0–0.2)
BASOPHILS NFR BLD: 0.6 %
BUN SERPL-MCNC: 15 MG/DL (ref 7–20)
CALCIUM SERPL-MCNC: 9.6 MG/DL (ref 8.3–10.6)
CHLORIDE SERPL-SCNC: 99 MMOL/L (ref 99–110)
CO2 SERPL-SCNC: 21 MMOL/L (ref 21–32)
CREAT SERPL-MCNC: 0.9 MG/DL (ref 0.8–1.3)
DEPRECATED RDW RBC AUTO: 14.4 % (ref 12.4–15.4)
EOSINOPHIL # BLD: 0 K/UL (ref 0–0.6)
EOSINOPHIL NFR BLD: 0.2 %
GFR SERPLBLD CREATININE-BSD FMLA CKD-EPI: >60 ML/MIN/{1.73_M2}
GLUCOSE BLD-MCNC: 240 MG/DL (ref 70–99)
GLUCOSE BLD-MCNC: 272 MG/DL (ref 70–99)
GLUCOSE BLD-MCNC: 276 MG/DL (ref 70–99)
GLUCOSE BLD-MCNC: 287 MG/DL (ref 70–99)
GLUCOSE SERPL-MCNC: 336 MG/DL (ref 70–99)
HCT VFR BLD AUTO: 41.1 % (ref 40.5–52.5)
HGB BLD-MCNC: 13.9 G/DL (ref 13.5–17.5)
LYMPHOCYTES # BLD: 1.5 K/UL (ref 1–5.1)
LYMPHOCYTES NFR BLD: 8.3 %
MCH RBC QN AUTO: 29.8 PG (ref 26–34)
MCHC RBC AUTO-ENTMCNC: 33.8 G/DL (ref 31–36)
MCV RBC AUTO: 88 FL (ref 80–100)
MONOCYTES # BLD: 1.1 K/UL (ref 0–1.3)
MONOCYTES NFR BLD: 6.1 %
NEUTROPHILS # BLD: 15.3 K/UL (ref 1.7–7.7)
NEUTROPHILS NFR BLD: 84.8 %
PERFORMED ON: ABNORMAL
PLATELET # BLD AUTO: 207 K/UL (ref 135–450)
PMV BLD AUTO: 9 FL (ref 5–10.5)
POTASSIUM SERPL-SCNC: 4.1 MMOL/L (ref 3.5–5.1)
RBC # BLD AUTO: 4.67 M/UL (ref 4.2–5.9)
SODIUM SERPL-SCNC: 135 MMOL/L (ref 136–145)
WBC # BLD AUTO: 18.1 K/UL (ref 4–11)

## 2023-12-31 PROCEDURE — 1200000000 HC SEMI PRIVATE

## 2023-12-31 PROCEDURE — 2580000003 HC RX 258: Performed by: STUDENT IN AN ORGANIZED HEALTH CARE EDUCATION/TRAINING PROGRAM

## 2023-12-31 PROCEDURE — 36415 COLL VENOUS BLD VENIPUNCTURE: CPT

## 2023-12-31 PROCEDURE — 6360000002 HC RX W HCPCS: Performed by: STUDENT IN AN ORGANIZED HEALTH CARE EDUCATION/TRAINING PROGRAM

## 2023-12-31 PROCEDURE — 80048 BASIC METABOLIC PNL TOTAL CA: CPT

## 2023-12-31 PROCEDURE — 85025 COMPLETE CBC W/AUTO DIFF WBC: CPT

## 2023-12-31 PROCEDURE — 6370000000 HC RX 637 (ALT 250 FOR IP): Performed by: STUDENT IN AN ORGANIZED HEALTH CARE EDUCATION/TRAINING PROGRAM

## 2023-12-31 RX ORDER — INSULIN ASPART 100 [IU]/ML
0-10 INJECTION, SOLUTION INTRAVENOUS; SUBCUTANEOUS
COMMUNITY

## 2023-12-31 RX ORDER — DOCUSATE SODIUM 100 MG/1
100 CAPSULE, LIQUID FILLED ORAL
COMMUNITY

## 2023-12-31 RX ORDER — PIOGLITAZONEHYDROCHLORIDE 15 MG/1
15 TABLET ORAL DAILY
COMMUNITY

## 2023-12-31 RX ORDER — DIVALPROEX SODIUM 250 MG/1
250 TABLET, DELAYED RELEASE ORAL 2 TIMES DAILY
COMMUNITY

## 2023-12-31 RX ORDER — POLYETHYLENE GLYCOL 3350 17 G/17G
17 POWDER, FOR SOLUTION ORAL DAILY
COMMUNITY

## 2023-12-31 RX ORDER — RISPERIDONE 0.5 MG/1
0.5 TABLET ORAL EVERY EVENING
COMMUNITY

## 2023-12-31 RX ORDER — ACETAMINOPHEN 325 MG/1
650 TABLET ORAL EVERY 6 HOURS PRN
COMMUNITY

## 2023-12-31 RX ORDER — INSULIN GLARGINE 100 [IU]/ML
70 INJECTION, SOLUTION SUBCUTANEOUS NIGHTLY
COMMUNITY

## 2023-12-31 RX ORDER — ENOXAPARIN SODIUM 100 MG/ML
30 INJECTION SUBCUTANEOUS 2 TIMES DAILY
Status: DISCONTINUED | OUTPATIENT
Start: 2023-12-31 | End: 2024-01-04

## 2023-12-31 RX ADMIN — LISINOPRIL 40 MG: 40 TABLET ORAL at 09:02

## 2023-12-31 RX ADMIN — INSULIN LISPRO 2 UNITS: 100 INJECTION, SOLUTION INTRAVENOUS; SUBCUTANEOUS at 16:54

## 2023-12-31 RX ADMIN — INSULIN LISPRO 2 UNITS: 100 INJECTION, SOLUTION INTRAVENOUS; SUBCUTANEOUS at 12:07

## 2023-12-31 RX ADMIN — INSULIN GLARGINE 10 UNITS: 100 INJECTION, SOLUTION SUBCUTANEOUS at 20:26

## 2023-12-31 RX ADMIN — AMLODIPINE BESYLATE 10 MG: 10 TABLET ORAL at 09:02

## 2023-12-31 RX ADMIN — INSULIN LISPRO 2 UNITS: 100 INJECTION, SOLUTION INTRAVENOUS; SUBCUTANEOUS at 09:55

## 2023-12-31 RX ADMIN — ENOXAPARIN SODIUM 30 MG: 100 INJECTION SUBCUTANEOUS at 20:26

## 2023-12-31 RX ADMIN — CARVEDILOL 25 MG: 25 TABLET, FILM COATED ORAL at 20:26

## 2023-12-31 RX ADMIN — DULOXETINE HYDROCHLORIDE 60 MG: 60 CAPSULE, DELAYED RELEASE ORAL at 09:02

## 2023-12-31 RX ADMIN — ACETAMINOPHEN 650 MG: 325 TABLET ORAL at 02:56

## 2023-12-31 RX ADMIN — SODIUM CHLORIDE, PRESERVATIVE FREE 10 ML: 5 INJECTION INTRAVENOUS at 20:29

## 2023-12-31 RX ADMIN — ATORVASTATIN CALCIUM 40 MG: 40 TABLET, FILM COATED ORAL at 09:02

## 2023-12-31 RX ADMIN — CARVEDILOL 25 MG: 25 TABLET, FILM COATED ORAL at 09:02

## 2023-12-31 RX ADMIN — SODIUM CHLORIDE, PRESERVATIVE FREE 10 ML: 5 INJECTION INTRAVENOUS at 09:02

## 2023-12-31 NOTE — CARE COORDINATION
CM asked by nurse to find facility patient came from and emergency contact. CM able to determine patient is from [facility], and noted emergency contact from previous medical records for a Sil Mitchell (h- [phone] and c- [phone]).     CM also called liaison for [facility] and left voicemail for return call regarding patient and his LOC and emergency contact information. CM did review the [facility] probate court for guardianship documentation and none were noted.     Nursing updated on above current information. CM still awaiting return call from liaison with facility for updated information.    Thank you,  Costa ALDRICHN, RN,   4th Floor Progressive Care Unit  [phone]

## 2023-12-31 NOTE — PLAN OF CARE
Problem: Pain  Goal: Verbalizes/displays adequate comfort level or baseline comfort level  12/30/2023 2153 by Mary Navarro, RN  Outcome: Progressing  Note: Pt nonverbal at baseline, will only respond with yes. Using NVPS scale to rate pain. Frequent reassessment preformed.    Problem: Safety - Adult  Goal: Free from fall injury  12/30/2023 2153 by Mary Navarro, RN  Outcome: Progressing  All fall precautions in place. Bed locked and in lowest position with alarm on. Overbed table and personal belonings within reach. Call light within reach and patient instructed to use call light for assistance. Non-skid socks on.

## 2023-12-31 NOTE — PLAN OF CARE
Problem: Pain  Goal: Verbalizes/displays adequate comfort level or baseline comfort level  12/31/2023 0856 by Shannon Da Silva, RN  Outcome: Progressing  Pt endorsing pain to R hip. Being treated with PRN pain medication, rest, and frequent repositioning with pillow support for comfort and pressure relief. Pt reports some relief from pain with above interventions.      Problem: Safety - Adult  Goal: Free from fall injury  12/31/2023 0856 by Shannon Da Silva, RN  Outcome: Progressing  All fall precautions in place. Bed locked and in lowest position with alarm on. Overbed table and personal belonings within reach. Call light within reach and patient instructed to use call light for assistance. Non-skid socks on.

## 2024-01-01 LAB
GLUCOSE BLD-MCNC: 276 MG/DL (ref 70–99)
GLUCOSE BLD-MCNC: 299 MG/DL (ref 70–99)
GLUCOSE BLD-MCNC: 310 MG/DL (ref 70–99)
GLUCOSE BLD-MCNC: 344 MG/DL (ref 70–99)
PERFORMED ON: ABNORMAL

## 2024-01-01 PROCEDURE — 6370000000 HC RX 637 (ALT 250 FOR IP): Performed by: STUDENT IN AN ORGANIZED HEALTH CARE EDUCATION/TRAINING PROGRAM

## 2024-01-01 PROCEDURE — 2580000003 HC RX 258: Performed by: STUDENT IN AN ORGANIZED HEALTH CARE EDUCATION/TRAINING PROGRAM

## 2024-01-01 PROCEDURE — 1200000000 HC SEMI PRIVATE

## 2024-01-01 PROCEDURE — 6360000002 HC RX W HCPCS: Performed by: STUDENT IN AN ORGANIZED HEALTH CARE EDUCATION/TRAINING PROGRAM

## 2024-01-01 RX ORDER — INSULIN LISPRO 100 [IU]/ML
0-4 INJECTION, SOLUTION INTRAVENOUS; SUBCUTANEOUS NIGHTLY
Status: DISCONTINUED | OUTPATIENT
Start: 2024-01-01 | End: 2024-01-04

## 2024-01-01 RX ORDER — INSULIN GLARGINE 100 [IU]/ML
15 INJECTION, SOLUTION SUBCUTANEOUS NIGHTLY
Status: DISCONTINUED | OUTPATIENT
Start: 2024-01-01 | End: 2024-01-04

## 2024-01-01 RX ORDER — INSULIN LISPRO 100 [IU]/ML
0-8 INJECTION, SOLUTION INTRAVENOUS; SUBCUTANEOUS
Status: DISCONTINUED | OUTPATIENT
Start: 2024-01-01 | End: 2024-01-04

## 2024-01-01 RX ADMIN — INSULIN LISPRO 6 UNITS: 100 INJECTION, SOLUTION INTRAVENOUS; SUBCUTANEOUS at 13:42

## 2024-01-01 RX ADMIN — INSULIN LISPRO 4 UNITS: 100 INJECTION, SOLUTION INTRAVENOUS; SUBCUTANEOUS at 20:42

## 2024-01-01 RX ADMIN — ACETAMINOPHEN 650 MG: 325 TABLET ORAL at 13:42

## 2024-01-01 RX ADMIN — PANTOPRAZOLE SODIUM 40 MG: 40 TABLET, DELAYED RELEASE ORAL at 05:14

## 2024-01-01 RX ADMIN — LISINOPRIL 40 MG: 40 TABLET ORAL at 08:25

## 2024-01-01 RX ADMIN — ENOXAPARIN SODIUM 30 MG: 100 INJECTION SUBCUTANEOUS at 20:42

## 2024-01-01 RX ADMIN — INSULIN GLARGINE 15 UNITS: 100 INJECTION, SOLUTION SUBCUTANEOUS at 20:42

## 2024-01-01 RX ADMIN — SODIUM CHLORIDE, PRESERVATIVE FREE 10 ML: 5 INJECTION INTRAVENOUS at 08:29

## 2024-01-01 RX ADMIN — ATORVASTATIN CALCIUM 40 MG: 40 TABLET, FILM COATED ORAL at 08:25

## 2024-01-01 RX ADMIN — CARVEDILOL 25 MG: 25 TABLET, FILM COATED ORAL at 08:25

## 2024-01-01 RX ADMIN — CARVEDILOL 25 MG: 25 TABLET, FILM COATED ORAL at 20:50

## 2024-01-01 RX ADMIN — INSULIN LISPRO 4 UNITS: 100 INJECTION, SOLUTION INTRAVENOUS; SUBCUTANEOUS at 08:24

## 2024-01-01 RX ADMIN — AMLODIPINE BESYLATE 10 MG: 10 TABLET ORAL at 08:25

## 2024-01-01 RX ADMIN — DULOXETINE HYDROCHLORIDE 60 MG: 60 CAPSULE, DELAYED RELEASE ORAL at 08:25

## 2024-01-01 RX ADMIN — ENOXAPARIN SODIUM 30 MG: 100 INJECTION SUBCUTANEOUS at 08:29

## 2024-01-01 RX ADMIN — INSULIN LISPRO 4 UNITS: 100 INJECTION, SOLUTION INTRAVENOUS; SUBCUTANEOUS at 17:43

## 2024-01-01 NOTE — PLAN OF CARE
Problem: Pain  Goal: Verbalizes/displays adequate comfort level or baseline comfort level  12/31/2023 1953 by Mary Navarro, RN  Outcome: Progressing       Problem: Safety - Adult  Goal: Free from fall injury  12/31/2023 1953 by Mary Navarro, RN  Outcome: Progressing  All fall precautions in place. Bed locked and in lowest position with alarm on. Overbed table and personal belonings within reach. Call light within reach and patient instructed to use call light for assistance. Non-skid socks on.A

## 2024-01-01 NOTE — CARE COORDINATION
Case Management Assessment  Initial Evaluation    Date/Time of Evaluation: 1/1/2024 1:03 PM  Assessment Completed by: YUDI Guillory    If patient is discharged prior to next notation, then this note serves as note for discharge by case management.    Patient Name: Alfredo Rosenberg                   YOB: 1955  Diagnosis: Closed fracture of neck of right femur, initial encounter ([facility]) [S72.001A]  Fall at home, initial encounter [W19.XXXA, Y92.009]                   Date / Time: 12/30/2023  2:40 PM    Patient Admission Status: Inpatient   Readmission Risk (Low < 19, Mod (19-27), High > 27): Readmission Risk Score: 9.4    Current PCP: Wong Guzman MD  PCP verified by CM? Yes    Chart Reviewed: Yes      History Provided by: Other (see comment), Medical Record (staff at [facility] and Legal Guardian)  Patient Orientation: Other (see comment) (not alert and oriented)    Patient Cognition: Dementia / Early Alzheimer's    Hospitalization in the last 30 days (Readmission):  No    If yes, Readmission Assessment in  Navigator will be completed.    Advance Directives:      Code Status: Full Code   Patient's Primary Decision Maker is: Named in Scanned ACP Document    Primary Decision Maker: Sil Mitchell - Other - [phone]    Primary Decision Maker: Eloise Fish - Legal Guardian, Legal Guardian - [phone]    Discharge Planning:    Patient lives with: Alone Type of Home: Long-Term Care ([facility])  Primary Care Giver: Other (Comment) (staff at [facility])  Patient Support Systems include: Other (Comment) (legal guardian and staff at [facility])   Current Financial resources: Medicaid  Current community resources: ECF/Home Care  Current services prior to admission: Extended Care Facility ([facility])            Current DME:              Type of Home Care services:  None    ADLS  Prior functional level: Independent in ADLs/IADLs  Current functional level: Other (see comment) (tbd)    PT AM-PAC:   /24  OT AM-PAC:

## 2024-01-01 NOTE — PLAN OF CARE
Problem: Discharge Planning  Goal: Discharge to home or other facility with appropriate resources  Outcome: Progressing   Case management is consulted for discharge.  Problem: Pain  Goal: Verbalizes/displays adequate comfort level or baseline comfort level  Outcome: Progressing   Patients pain medications are being managed by the MAR.  Problem: Safety - Adult  Goal: Free from fall injury  Outcome: Progressing   Patient has all standard fall precautions in place.  Problem: ABCDS Injury Assessment  Goal: Absence of physical injury  Outcome: Progressing   Patient remains free of physical injury.

## 2024-01-02 LAB
ANION GAP SERPL CALCULATED.3IONS-SCNC: 14 MMOL/L (ref 3–16)
BASOPHILS # BLD: 0 K/UL (ref 0–0.2)
BASOPHILS NFR BLD: 0.2 %
BUN SERPL-MCNC: 24 MG/DL (ref 7–20)
CALCIUM SERPL-MCNC: 9.3 MG/DL (ref 8.3–10.6)
CHLORIDE SERPL-SCNC: 103 MMOL/L (ref 99–110)
CO2 SERPL-SCNC: 23 MMOL/L (ref 21–32)
CREAT SERPL-MCNC: 0.8 MG/DL (ref 0.8–1.3)
DEPRECATED RDW RBC AUTO: 14.2 % (ref 12.4–15.4)
EOSINOPHIL # BLD: 0 K/UL (ref 0–0.6)
EOSINOPHIL NFR BLD: 0.2 %
GFR SERPLBLD CREATININE-BSD FMLA CKD-EPI: >60 ML/MIN/{1.73_M2}
GLUCOSE BLD-MCNC: 275 MG/DL (ref 70–99)
GLUCOSE BLD-MCNC: 290 MG/DL (ref 70–99)
GLUCOSE BLD-MCNC: 301 MG/DL (ref 70–99)
GLUCOSE BLD-MCNC: 341 MG/DL (ref 70–99)
GLUCOSE SERPL-MCNC: 338 MG/DL (ref 70–99)
HCT VFR BLD AUTO: 42.9 % (ref 40.5–52.5)
HGB BLD-MCNC: 14.3 G/DL (ref 13.5–17.5)
LYMPHOCYTES # BLD: 1.6 K/UL (ref 1–5.1)
LYMPHOCYTES NFR BLD: 9.7 %
MCH RBC QN AUTO: 29.9 PG (ref 26–34)
MCHC RBC AUTO-ENTMCNC: 33.3 G/DL (ref 31–36)
MCV RBC AUTO: 89.5 FL (ref 80–100)
MONOCYTES # BLD: 1.2 K/UL (ref 0–1.3)
MONOCYTES NFR BLD: 7.3 %
NEUTROPHILS # BLD: 13.4 K/UL (ref 1.7–7.7)
NEUTROPHILS NFR BLD: 82.6 %
PERFORMED ON: ABNORMAL
PLATELET # BLD AUTO: 228 K/UL (ref 135–450)
PMV BLD AUTO: 9.1 FL (ref 5–10.5)
POTASSIUM SERPL-SCNC: 3.8 MMOL/L (ref 3.5–5.1)
RBC # BLD AUTO: 4.79 M/UL (ref 4.2–5.9)
SODIUM SERPL-SCNC: 140 MMOL/L (ref 136–145)
WBC # BLD AUTO: 16.2 K/UL (ref 4–11)

## 2024-01-02 PROCEDURE — 1200000000 HC SEMI PRIVATE

## 2024-01-02 PROCEDURE — 6360000002 HC RX W HCPCS: Performed by: NURSE PRACTITIONER

## 2024-01-02 PROCEDURE — 2580000003 HC RX 258: Performed by: STUDENT IN AN ORGANIZED HEALTH CARE EDUCATION/TRAINING PROGRAM

## 2024-01-02 PROCEDURE — 83036 HEMOGLOBIN GLYCOSYLATED A1C: CPT

## 2024-01-02 PROCEDURE — 36415 COLL VENOUS BLD VENIPUNCTURE: CPT

## 2024-01-02 PROCEDURE — 6370000000 HC RX 637 (ALT 250 FOR IP): Performed by: STUDENT IN AN ORGANIZED HEALTH CARE EDUCATION/TRAINING PROGRAM

## 2024-01-02 PROCEDURE — 6360000002 HC RX W HCPCS: Performed by: STUDENT IN AN ORGANIZED HEALTH CARE EDUCATION/TRAINING PROGRAM

## 2024-01-02 PROCEDURE — 85025 COMPLETE CBC W/AUTO DIFF WBC: CPT

## 2024-01-02 PROCEDURE — 80048 BASIC METABOLIC PNL TOTAL CA: CPT

## 2024-01-02 RX ORDER — HYDRALAZINE HYDROCHLORIDE 20 MG/ML
10 INJECTION INTRAMUSCULAR; INTRAVENOUS EVERY 6 HOURS PRN
Status: DISCONTINUED | OUTPATIENT
Start: 2024-01-02 | End: 2024-01-08 | Stop reason: HOSPADM

## 2024-01-02 RX ADMIN — LISINOPRIL 40 MG: 40 TABLET ORAL at 09:09

## 2024-01-02 RX ADMIN — INSULIN LISPRO 6 UNITS: 100 INJECTION, SOLUTION INTRAVENOUS; SUBCUTANEOUS at 09:09

## 2024-01-02 RX ADMIN — ATORVASTATIN CALCIUM 40 MG: 40 TABLET, FILM COATED ORAL at 09:09

## 2024-01-02 RX ADMIN — ACETAMINOPHEN 650 MG: 325 TABLET ORAL at 05:21

## 2024-01-02 RX ADMIN — HYDRALAZINE HYDROCHLORIDE 10 MG: 20 INJECTION INTRAMUSCULAR; INTRAVENOUS at 06:52

## 2024-01-02 RX ADMIN — INSULIN LISPRO 6 UNITS: 100 INJECTION, SOLUTION INTRAVENOUS; SUBCUTANEOUS at 18:04

## 2024-01-02 RX ADMIN — CARVEDILOL 25 MG: 25 TABLET, FILM COATED ORAL at 09:09

## 2024-01-02 RX ADMIN — ENOXAPARIN SODIUM 30 MG: 100 INJECTION SUBCUTANEOUS at 09:15

## 2024-01-02 RX ADMIN — INSULIN LISPRO 4 UNITS: 100 INJECTION, SOLUTION INTRAVENOUS; SUBCUTANEOUS at 12:49

## 2024-01-02 RX ADMIN — DULOXETINE HYDROCHLORIDE 60 MG: 60 CAPSULE, DELAYED RELEASE ORAL at 09:09

## 2024-01-02 RX ADMIN — CARVEDILOL 25 MG: 25 TABLET, FILM COATED ORAL at 21:05

## 2024-01-02 RX ADMIN — SODIUM CHLORIDE, PRESERVATIVE FREE 5 ML: 5 INJECTION INTRAVENOUS at 12:00

## 2024-01-02 RX ADMIN — INSULIN GLARGINE 15 UNITS: 100 INJECTION, SOLUTION SUBCUTANEOUS at 21:05

## 2024-01-02 RX ADMIN — ENOXAPARIN SODIUM 30 MG: 100 INJECTION SUBCUTANEOUS at 21:05

## 2024-01-02 RX ADMIN — ACETAMINOPHEN 650 MG: 325 TABLET ORAL at 18:04

## 2024-01-02 RX ADMIN — AMLODIPINE BESYLATE 10 MG: 10 TABLET ORAL at 09:09

## 2024-01-02 ASSESSMENT — PAIN DESCRIPTION - LOCATION
LOCATION: HIP
LOCATION: HIP

## 2024-01-02 ASSESSMENT — PAIN DESCRIPTION - PAIN TYPE: TYPE: ACUTE PAIN

## 2024-01-02 ASSESSMENT — PAIN SCALES - GENERAL
PAINLEVEL_OUTOF10: 0
PAINLEVEL_OUTOF10: 3
PAINLEVEL_OUTOF10: 0
PAINLEVEL_OUTOF10: 4

## 2024-01-02 ASSESSMENT — PAIN DESCRIPTION - DESCRIPTORS: DESCRIPTORS: ACHING

## 2024-01-02 ASSESSMENT — PAIN - FUNCTIONAL ASSESSMENT
PAIN_FUNCTIONAL_ASSESSMENT: PREVENTS OR INTERFERES SOME ACTIVE ACTIVITIES AND ADLS
PAIN_FUNCTIONAL_ASSESSMENT: PREVENTS OR INTERFERES SOME ACTIVE ACTIVITIES AND ADLS

## 2024-01-02 ASSESSMENT — PAIN DESCRIPTION - ORIENTATION
ORIENTATION: RIGHT
ORIENTATION: RIGHT

## 2024-01-02 NOTE — CARE COORDINATION
CM following: Pt is from LTC at [facility] and can return at discharge. Pt is a paid bed hold and does not need precert to return. Pt will need medical transport at discharge. Pt's legal guardian is Eloise Fish as documented in the pt's chart. CM will continue to follow.  Electronically signed by YUDI Guillory on 1/2/2024 at 3:50 PM  [phone]   Please clarify with pt if she's still having periods.  If so, then her pap test is normal.  If not, then we should consider scheduling her for an endometrial biopsy as uterine cells were seen on her pap test.   This can be normal if she's having periods, but if she's postmenopausal, then we need to further evaluate.

## 2024-01-02 NOTE — PLAN OF CARE
Orthopedic surgery plan of care:    Alfredo Rosenberg is a 68 y.o. male with a complete displaced transcervical femoral neck fracture.    Plan:  - I spoke with his legal guardian, Eloise Fish, who has consented to right total hip arthroplasty  - I discussed that given the fact that he is transcervical femoral neck fracture, the fact that he is 68 years old, was an unassisted community ambulator and despite living in a nursing home with a history of CVA and dementia, he was able to do most activities of daily living, he would benefit from a right total hip replacement.  - The patient also has some very mild underlying right hip osteoarthritis.  - The risk, benefits, indications, alternatives were discussed at length with his legal guardian.  The risk of surgery include bleeding, infections, pneumonias, heart complications, blood clots, loss of limb, loss of life, damage to surrounding structures including nerves, arteries, veins, tendons, dislocation, leg length discrepancy, foot drop, need for explantation and placement of an antibiotic spacer versus revision surgery.  - After very lengthy discussion, the legal guardian agreed to proceed with surgical intervention.  - Informed consent obtained  - OR tomorrow for right direct anterior total hip arthroplasty for femoral neck fracture  - CBC, BMP, hemoglobin A1c prior to surgery  - N.p.o. at midnight  - Bedrest  - Nonweightbearing of the right lower extremity  - Full consultation note to follow    Fernandez Bruner MD  [facility]  [address] [place] [address]  [address] [place] [id number]  O: [phone]  C: [phone]

## 2024-01-02 NOTE — PLAN OF CARE
Problem: Pain  Goal: Verbalizes/displays adequate comfort level or baseline comfort level  1/2/2024 0756 by Aby King, RN  Outcome: Progressing   Pain medications are being managed by the MAR.      Problem: Safety - Adult  Goal: Free from fall injury  1/1/2024 1854 by Ayb King, RN  Outcome: Progressing   Patient has all safety precautions in place.      Problem: ABCDS Injury Assessment  Goal: Absence of physical injury  1/1/2024 1854 by Aby King, RN  Outcome: Progressing   Patient remains free from injury at this time.

## 2024-01-02 NOTE — PLAN OF CARE
Problem: Pain  Goal: Verbalizes/displays adequate comfort level or baseline comfort level  1/2/2024 0053 by Julianne Cespedes, RN  Outcome: Progressing  Note: Pain scored using FLACC. No pain according to the scale.     Problem: Safety - Adult  Goal: Free from fall injury  1/2/2024 0053 by Julianne Cespedes, RN  Outcome: Progressing  Note: Pt will remain free of falls for the duration of the shift. [facility]INA orientation d/t pt being non-verbal.   Problem: Skin/Tissue Integrity  Goal: Absence of new skin breakdown  Description: 1.  Monitor for areas of redness and/or skin breakdown  2.  Assess vascular access sites hourly  3.  Every 4-6 hours minimum:  Change oxygen saturation probe site  4.  Every 4-6 hours:  If on nasal continuous positive airway pressure, respiratory therapy assess nares and determine need for appliance change or resting period.  Outcome: Progressing  Note: Pt on a special mattress and turned frequently.

## 2024-01-03 ENCOUNTER — APPOINTMENT (OUTPATIENT)
Dept: GENERAL RADIOLOGY | Age: 69
DRG: 522 | End: 2024-01-03
Payer: MEDICARE

## 2024-01-03 ENCOUNTER — ANESTHESIA (OUTPATIENT)
Dept: OPERATING ROOM | Age: 69
End: 2024-01-03
Payer: MEDICARE

## 2024-01-03 ENCOUNTER — ANESTHESIA EVENT (OUTPATIENT)
Dept: OPERATING ROOM | Age: 69
End: 2024-01-03
Payer: MEDICARE

## 2024-01-03 LAB
ANION GAP SERPL CALCULATED.3IONS-SCNC: 10 MMOL/L (ref 3–16)
BUN SERPL-MCNC: 28 MG/DL (ref 7–20)
CALCIUM SERPL-MCNC: 9.2 MG/DL (ref 8.3–10.6)
CHLORIDE SERPL-SCNC: 103 MMOL/L (ref 99–110)
CO2 SERPL-SCNC: 25 MMOL/L (ref 21–32)
CREAT SERPL-MCNC: 0.8 MG/DL (ref 0.8–1.3)
DEPRECATED RDW RBC AUTO: 14.7 % (ref 12.4–15.4)
EST. AVERAGE GLUCOSE BLD GHB EST-MCNC: 174.3 MG/DL
GFR SERPLBLD CREATININE-BSD FMLA CKD-EPI: >60 ML/MIN/{1.73_M2}
GLUCOSE BLD-MCNC: 254 MG/DL (ref 70–99)
GLUCOSE BLD-MCNC: 281 MG/DL (ref 70–99)
GLUCOSE BLD-MCNC: 297 MG/DL (ref 70–99)
GLUCOSE BLD-MCNC: 300 MG/DL (ref 70–99)
GLUCOSE SERPL-MCNC: 329 MG/DL (ref 70–99)
HBA1C MFR BLD: 7.7 %
HCT VFR BLD AUTO: 42 % (ref 40.5–52.5)
HGB BLD-MCNC: 14.1 G/DL (ref 13.5–17.5)
MCH RBC QN AUTO: 29.4 PG (ref 26–34)
MCHC RBC AUTO-ENTMCNC: 33.4 G/DL (ref 31–36)
MCV RBC AUTO: 87.8 FL (ref 80–100)
PERFORMED ON: ABNORMAL
PLATELET # BLD AUTO: 249 K/UL (ref 135–450)
PMV BLD AUTO: 8.9 FL (ref 5–10.5)
POTASSIUM SERPL-SCNC: 3.6 MMOL/L (ref 3.5–5.1)
RBC # BLD AUTO: 4.79 M/UL (ref 4.2–5.9)
SODIUM SERPL-SCNC: 138 MMOL/L (ref 136–145)
WBC # BLD AUTO: 15.8 K/UL (ref 4–11)

## 2024-01-03 PROCEDURE — 3600000004 HC SURGERY LEVEL 4 BASE: Performed by: STUDENT IN AN ORGANIZED HEALTH CARE EDUCATION/TRAINING PROGRAM

## 2024-01-03 PROCEDURE — 83036 HEMOGLOBIN GLYCOSYLATED A1C: CPT

## 2024-01-03 PROCEDURE — 6370000000 HC RX 637 (ALT 250 FOR IP): Performed by: STUDENT IN AN ORGANIZED HEALTH CARE EDUCATION/TRAINING PROGRAM

## 2024-01-03 PROCEDURE — 2709999900 HC NON-CHARGEABLE SUPPLY: Performed by: STUDENT IN AN ORGANIZED HEALTH CARE EDUCATION/TRAINING PROGRAM

## 2024-01-03 PROCEDURE — 6360000002 HC RX W HCPCS: Performed by: NURSE PRACTITIONER

## 2024-01-03 PROCEDURE — 6370000000 HC RX 637 (ALT 250 FOR IP): Performed by: NURSE PRACTITIONER

## 2024-01-03 PROCEDURE — 2500000003 HC RX 250 WO HCPCS: Performed by: STUDENT IN AN ORGANIZED HEALTH CARE EDUCATION/TRAINING PROGRAM

## 2024-01-03 PROCEDURE — 2580000003 HC RX 258: Performed by: STUDENT IN AN ORGANIZED HEALTH CARE EDUCATION/TRAINING PROGRAM

## 2024-01-03 PROCEDURE — 6360000002 HC RX W HCPCS: Performed by: STUDENT IN AN ORGANIZED HEALTH CARE EDUCATION/TRAINING PROGRAM

## 2024-01-03 PROCEDURE — 2720000010 HC SURG SUPPLY STERILE: Performed by: STUDENT IN AN ORGANIZED HEALTH CARE EDUCATION/TRAINING PROGRAM

## 2024-01-03 PROCEDURE — 2500000003 HC RX 250 WO HCPCS: Performed by: NURSE ANESTHETIST, CERTIFIED REGISTERED

## 2024-01-03 PROCEDURE — 1200000000 HC SEMI PRIVATE

## 2024-01-03 PROCEDURE — 3600000014 HC SURGERY LEVEL 4 ADDTL 15MIN: Performed by: STUDENT IN AN ORGANIZED HEALTH CARE EDUCATION/TRAINING PROGRAM

## 2024-01-03 PROCEDURE — 3700000001 HC ADD 15 MINUTES (ANESTHESIA): Performed by: STUDENT IN AN ORGANIZED HEALTH CARE EDUCATION/TRAINING PROGRAM

## 2024-01-03 PROCEDURE — 3700000000 HC ANESTHESIA ATTENDED CARE: Performed by: STUDENT IN AN ORGANIZED HEALTH CARE EDUCATION/TRAINING PROGRAM

## 2024-01-03 PROCEDURE — 36415 COLL VENOUS BLD VENIPUNCTURE: CPT

## 2024-01-03 PROCEDURE — C1776 JOINT DEVICE (IMPLANTABLE): HCPCS | Performed by: STUDENT IN AN ORGANIZED HEALTH CARE EDUCATION/TRAINING PROGRAM

## 2024-01-03 PROCEDURE — 6360000002 HC RX W HCPCS: Performed by: NURSE ANESTHETIST, CERTIFIED REGISTERED

## 2024-01-03 PROCEDURE — A4217 STERILE WATER/SALINE, 500 ML: HCPCS | Performed by: STUDENT IN AN ORGANIZED HEALTH CARE EDUCATION/TRAINING PROGRAM

## 2024-01-03 PROCEDURE — 80048 BASIC METABOLIC PNL TOTAL CA: CPT

## 2024-01-03 PROCEDURE — 7100000001 HC PACU RECOVERY - ADDTL 15 MIN: Performed by: STUDENT IN AN ORGANIZED HEALTH CARE EDUCATION/TRAINING PROGRAM

## 2024-01-03 PROCEDURE — 7100000000 HC PACU RECOVERY - FIRST 15 MIN: Performed by: STUDENT IN AN ORGANIZED HEALTH CARE EDUCATION/TRAINING PROGRAM

## 2024-01-03 PROCEDURE — 0SR90JA REPLACEMENT OF RIGHT HIP JOINT WITH SYNTHETIC SUBSTITUTE, UNCEMENTED, OPEN APPROACH: ICD-10-PCS | Performed by: STUDENT IN AN ORGANIZED HEALTH CARE EDUCATION/TRAINING PROGRAM

## 2024-01-03 PROCEDURE — 2580000003 HC RX 258: Performed by: NURSE ANESTHETIST, CERTIFIED REGISTERED

## 2024-01-03 PROCEDURE — 85027 COMPLETE CBC AUTOMATED: CPT

## 2024-01-03 PROCEDURE — 73501 X-RAY EXAM HIP UNI 1 VIEW: CPT

## 2024-01-03 PROCEDURE — 72170 X-RAY EXAM OF PELVIS: CPT

## 2024-01-03 DEVICE — OR3O DUAL MOBILITY LINER 42/54
Type: IMPLANTABLE DEVICE | Site: HIP | Status: FUNCTIONAL
Brand: OR3O DUAL MOBILITY

## 2024-01-03 DEVICE — REFLECTION SPHERICAL HEAD SCREW 30MM
Type: IMPLANTABLE DEVICE | Site: HIP | Status: FUNCTIONAL
Brand: REFLECTION

## 2024-01-03 DEVICE — OR3O DUAL MOBILITY XLPE INSERT 28/42
Type: IMPLANTABLE DEVICE | Site: HIP | Status: FUNCTIONAL
Brand: OR3O DUAL MOBILITY

## 2024-01-03 DEVICE — R3 3 HOLE ACETABULAR SHELL 54MM
Type: IMPLANTABLE DEVICE | Site: HIP | Status: FUNCTIONAL
Brand: R3 ACETABULAR

## 2024-01-03 DEVICE — POLARSTEM COLLAR STANDARD                                    NON-CEMENTED WITH TI/HA 7
Type: IMPLANTABLE DEVICE | Site: HIP | Status: FUNCTIONAL
Brand: POLARSTEM

## 2024-01-03 DEVICE — HIP H2 TOT ADV OTHER HD IMPL CAPPED H2 SN: Type: IMPLANTABLE DEVICE | Site: HIP | Status: FUNCTIONAL

## 2024-01-03 DEVICE — REFLECTION SPHERICAL HEAD SCREW 35MM
Type: IMPLANTABLE DEVICE | Site: HIP | Status: FUNCTIONAL
Brand: REFLECTION

## 2024-01-03 DEVICE — OXINIUM FEMORAL HEAD 12/14 TAPER                                    28 MM +8
Type: IMPLANTABLE DEVICE | Site: HIP | Status: FUNCTIONAL
Brand: OXINIUM

## 2024-01-03 RX ORDER — ASPIRIN 81 MG/1
81 TABLET, CHEWABLE ORAL 2 TIMES DAILY
Status: DISCONTINUED | OUTPATIENT
Start: 2024-01-03 | End: 2024-01-08 | Stop reason: HOSPADM

## 2024-01-03 RX ORDER — VANCOMYCIN HYDROCHLORIDE 1 G/20ML
INJECTION, POWDER, LYOPHILIZED, FOR SOLUTION INTRAVENOUS PRN
Status: DISCONTINUED | OUTPATIENT
Start: 2024-01-03 | End: 2024-01-03 | Stop reason: ALTCHOICE

## 2024-01-03 RX ORDER — OXYCODONE HYDROCHLORIDE 5 MG/1
5 TABLET ORAL EVERY 4 HOURS PRN
Status: DISCONTINUED | OUTPATIENT
Start: 2024-01-03 | End: 2024-01-08 | Stop reason: HOSPADM

## 2024-01-03 RX ORDER — GLYCOPYRROLATE 0.2 MG/ML
INJECTION INTRAMUSCULAR; INTRAVENOUS PRN
Status: DISCONTINUED | OUTPATIENT
Start: 2024-01-03 | End: 2024-01-03 | Stop reason: SDUPTHER

## 2024-01-03 RX ORDER — ONDANSETRON 2 MG/ML
INJECTION INTRAMUSCULAR; INTRAVENOUS PRN
Status: DISCONTINUED | OUTPATIENT
Start: 2024-01-03 | End: 2024-01-03 | Stop reason: SDUPTHER

## 2024-01-03 RX ORDER — ONDANSETRON 2 MG/ML
4 INJECTION INTRAMUSCULAR; INTRAVENOUS
Status: DISCONTINUED | OUTPATIENT
Start: 2024-01-03 | End: 2024-01-03 | Stop reason: HOSPADM

## 2024-01-03 RX ORDER — OXYCODONE HYDROCHLORIDE 5 MG/1
5 TABLET ORAL EVERY 4 HOURS PRN
Status: DISCONTINUED | OUTPATIENT
Start: 2024-01-03 | End: 2024-01-03 | Stop reason: SDUPTHER

## 2024-01-03 RX ORDER — METHOCARBAMOL 500 MG/1
500 TABLET, FILM COATED ORAL 3 TIMES DAILY
Status: DISCONTINUED | OUTPATIENT
Start: 2024-01-03 | End: 2024-01-08 | Stop reason: HOSPADM

## 2024-01-03 RX ORDER — FENTANYL CITRATE 50 UG/ML
INJECTION, SOLUTION INTRAMUSCULAR; INTRAVENOUS PRN
Status: DISCONTINUED | OUTPATIENT
Start: 2024-01-03 | End: 2024-01-03 | Stop reason: SDUPTHER

## 2024-01-03 RX ORDER — HYDROMORPHONE HYDROCHLORIDE 1 MG/ML
0.5 INJECTION, SOLUTION INTRAMUSCULAR; INTRAVENOUS; SUBCUTANEOUS EVERY 5 MIN PRN
Status: DISCONTINUED | OUTPATIENT
Start: 2024-01-03 | End: 2024-01-03 | Stop reason: HOSPADM

## 2024-01-03 RX ORDER — SODIUM CHLORIDE, SODIUM LACTATE, POTASSIUM CHLORIDE, CALCIUM CHLORIDE 600; 310; 30; 20 MG/100ML; MG/100ML; MG/100ML; MG/100ML
INJECTION, SOLUTION INTRAVENOUS CONTINUOUS PRN
Status: DISCONTINUED | OUTPATIENT
Start: 2024-01-03 | End: 2024-01-03 | Stop reason: SDUPTHER

## 2024-01-03 RX ORDER — LIDOCAINE HYDROCHLORIDE 20 MG/ML
INJECTION, SOLUTION INTRAVENOUS PRN
Status: DISCONTINUED | OUTPATIENT
Start: 2024-01-03 | End: 2024-01-03 | Stop reason: SDUPTHER

## 2024-01-03 RX ORDER — PROPOFOL 10 MG/ML
INJECTION, EMULSION INTRAVENOUS PRN
Status: DISCONTINUED | OUTPATIENT
Start: 2024-01-03 | End: 2024-01-03 | Stop reason: SDUPTHER

## 2024-01-03 RX ORDER — HYDRALAZINE HYDROCHLORIDE 20 MG/ML
10 INJECTION INTRAMUSCULAR; INTRAVENOUS
Status: DISCONTINUED | OUTPATIENT
Start: 2024-01-03 | End: 2024-01-03 | Stop reason: HOSPADM

## 2024-01-03 RX ORDER — PROCHLORPERAZINE EDISYLATE 5 MG/ML
5 INJECTION INTRAMUSCULAR; INTRAVENOUS
Status: DISCONTINUED | OUTPATIENT
Start: 2024-01-03 | End: 2024-01-03 | Stop reason: HOSPADM

## 2024-01-03 RX ORDER — LABETALOL HYDROCHLORIDE 5 MG/ML
10 INJECTION, SOLUTION INTRAVENOUS
Status: DISCONTINUED | OUTPATIENT
Start: 2024-01-03 | End: 2024-01-03 | Stop reason: HOSPADM

## 2024-01-03 RX ORDER — SODIUM CHLORIDE 0.9 % (FLUSH) 0.9 %
5-40 SYRINGE (ML) INJECTION PRN
Status: DISCONTINUED | OUTPATIENT
Start: 2024-01-03 | End: 2024-01-03 | Stop reason: HOSPADM

## 2024-01-03 RX ORDER — SODIUM CHLORIDE 9 MG/ML
INJECTION, SOLUTION INTRAVENOUS PRN
Status: DISCONTINUED | OUTPATIENT
Start: 2024-01-03 | End: 2024-01-03 | Stop reason: HOSPADM

## 2024-01-03 RX ORDER — MIDAZOLAM HYDROCHLORIDE 1 MG/ML
INJECTION INTRAMUSCULAR; INTRAVENOUS PRN
Status: DISCONTINUED | OUTPATIENT
Start: 2024-01-03 | End: 2024-01-03 | Stop reason: SDUPTHER

## 2024-01-03 RX ORDER — CEFAZOLIN SODIUM 1 G/3ML
INJECTION, POWDER, FOR SOLUTION INTRAMUSCULAR; INTRAVENOUS PRN
Status: DISCONTINUED | OUTPATIENT
Start: 2024-01-03 | End: 2024-01-03 | Stop reason: SDUPTHER

## 2024-01-03 RX ORDER — SODIUM CHLORIDE 0.9 % (FLUSH) 0.9 %
5-40 SYRINGE (ML) INJECTION EVERY 12 HOURS SCHEDULED
Status: DISCONTINUED | OUTPATIENT
Start: 2024-01-03 | End: 2024-01-03 | Stop reason: HOSPADM

## 2024-01-03 RX ORDER — OXYCODONE HYDROCHLORIDE 5 MG/1
5 TABLET ORAL
Status: DISCONTINUED | OUTPATIENT
Start: 2024-01-03 | End: 2024-01-03 | Stop reason: HOSPADM

## 2024-01-03 RX ORDER — SUCCINYLCHOLINE/SOD CL,ISO/PF 200MG/10ML
SYRINGE (ML) INTRAVENOUS PRN
Status: DISCONTINUED | OUTPATIENT
Start: 2024-01-03 | End: 2024-01-03 | Stop reason: SDUPTHER

## 2024-01-03 RX ORDER — MEPERIDINE HYDROCHLORIDE 25 MG/ML
12.5 INJECTION INTRAMUSCULAR; INTRAVENOUS; SUBCUTANEOUS EVERY 5 MIN PRN
Status: DISCONTINUED | OUTPATIENT
Start: 2024-01-03 | End: 2024-01-03 | Stop reason: HOSPADM

## 2024-01-03 RX ORDER — HYDROMORPHONE HYDROCHLORIDE 2 MG/ML
INJECTION, SOLUTION INTRAMUSCULAR; INTRAVENOUS; SUBCUTANEOUS PRN
Status: DISCONTINUED | OUTPATIENT
Start: 2024-01-03 | End: 2024-01-03 | Stop reason: SDUPTHER

## 2024-01-03 RX ORDER — ROCURONIUM BROMIDE 10 MG/ML
INJECTION, SOLUTION INTRAVENOUS PRN
Status: DISCONTINUED | OUTPATIENT
Start: 2024-01-03 | End: 2024-01-03 | Stop reason: SDUPTHER

## 2024-01-03 RX ORDER — MAGNESIUM HYDROXIDE 1200 MG/15ML
LIQUID ORAL CONTINUOUS PRN
Status: COMPLETED | OUTPATIENT
Start: 2024-01-03 | End: 2024-01-03

## 2024-01-03 RX ORDER — ACETAMINOPHEN 500 MG
1000 TABLET ORAL EVERY 8 HOURS SCHEDULED
Status: DISCONTINUED | OUTPATIENT
Start: 2024-01-03 | End: 2024-01-08 | Stop reason: HOSPADM

## 2024-01-03 RX ORDER — PHENYLEPHRINE HYDROCHLORIDE 10 MG/ML
INJECTION INTRAVENOUS PRN
Status: DISCONTINUED | OUTPATIENT
Start: 2024-01-03 | End: 2024-01-03 | Stop reason: SDUPTHER

## 2024-01-03 RX ORDER — BUPIVACAINE HYDROCHLORIDE AND EPINEPHRINE 5; 5 MG/ML; UG/ML
INJECTION, SOLUTION EPIDURAL; INTRACAUDAL; PERINEURAL PRN
Status: DISCONTINUED | OUTPATIENT
Start: 2024-01-03 | End: 2024-01-03 | Stop reason: ALTCHOICE

## 2024-01-03 RX ORDER — OXYCODONE HYDROCHLORIDE 5 MG/1
10 TABLET ORAL EVERY 4 HOURS PRN
Status: DISCONTINUED | OUTPATIENT
Start: 2024-01-03 | End: 2024-01-08 | Stop reason: HOSPADM

## 2024-01-03 RX ADMIN — PHENYLEPHRINE HYDROCHLORIDE 100 MCG: 10 INJECTION INTRAVENOUS at 18:16

## 2024-01-03 RX ADMIN — PHENYLEPHRINE HYDROCHLORIDE 100 MCG: 10 INJECTION INTRAVENOUS at 17:45

## 2024-01-03 RX ADMIN — GLYCOPYRROLATE 0.2 MG: 0.2 INJECTION INTRAMUSCULAR; INTRAVENOUS at 17:28

## 2024-01-03 RX ADMIN — HYDRALAZINE HYDROCHLORIDE 10 MG: 20 INJECTION INTRAMUSCULAR; INTRAVENOUS at 00:26

## 2024-01-03 RX ADMIN — INSULIN LISPRO 6 UNITS: 100 INJECTION, SOLUTION INTRAVENOUS; SUBCUTANEOUS at 08:00

## 2024-01-03 RX ADMIN — ENOXAPARIN SODIUM 30 MG: 100 INJECTION SUBCUTANEOUS at 21:56

## 2024-01-03 RX ADMIN — PHENYLEPHRINE HYDROCHLORIDE 100 MCG: 10 INJECTION INTRAVENOUS at 18:20

## 2024-01-03 RX ADMIN — ASPIRIN 81 MG: 81 TABLET, CHEWABLE ORAL at 21:56

## 2024-01-03 RX ADMIN — FENTANYL CITRATE 50 MCG: 50 INJECTION, SOLUTION INTRAMUSCULAR; INTRAVENOUS at 16:49

## 2024-01-03 RX ADMIN — LIDOCAINE HYDROCHLORIDE 50 MG: 20 INJECTION, SOLUTION INTRAVENOUS at 16:45

## 2024-01-03 RX ADMIN — DULOXETINE HYDROCHLORIDE 60 MG: 60 CAPSULE, DELAYED RELEASE ORAL at 08:00

## 2024-01-03 RX ADMIN — PHENYLEPHRINE HYDROCHLORIDE 100 MCG: 10 INJECTION INTRAVENOUS at 17:48

## 2024-01-03 RX ADMIN — SODIUM CHLORIDE, SODIUM LACTATE, POTASSIUM CHLORIDE, AND CALCIUM CHLORIDE: .6; .31; .03; .02 INJECTION, SOLUTION INTRAVENOUS at 16:38

## 2024-01-03 RX ADMIN — TRANEXAMIC ACID 1000 MG: 100 INJECTION, SOLUTION INTRAVENOUS at 19:03

## 2024-01-03 RX ADMIN — PROPOFOL 120 MG: 10 INJECTION, EMULSION INTRAVENOUS at 16:46

## 2024-01-03 RX ADMIN — CARVEDILOL 25 MG: 25 TABLET, FILM COATED ORAL at 08:00

## 2024-01-03 RX ADMIN — ATORVASTATIN CALCIUM 40 MG: 40 TABLET, FILM COATED ORAL at 08:00

## 2024-01-03 RX ADMIN — SODIUM CHLORIDE, PRESERVATIVE FREE 10 ML: 5 INJECTION INTRAVENOUS at 21:57

## 2024-01-03 RX ADMIN — ONDANSETRON 4 MG: 2 INJECTION INTRAMUSCULAR; INTRAVENOUS at 18:49

## 2024-01-03 RX ADMIN — PHENYLEPHRINE HYDROCHLORIDE 200 MCG: 10 INJECTION INTRAVENOUS at 17:07

## 2024-01-03 RX ADMIN — TRANEXAMIC ACID 1000 MG: 100 INJECTION, SOLUTION INTRAVENOUS at 17:06

## 2024-01-03 RX ADMIN — METHOCARBAMOL 500 MG: 500 TABLET ORAL at 21:56

## 2024-01-03 RX ADMIN — INSULIN GLARGINE 15 UNITS: 100 INJECTION, SOLUTION SUBCUTANEOUS at 21:56

## 2024-01-03 RX ADMIN — SODIUM CHLORIDE, SODIUM LACTATE, POTASSIUM CHLORIDE, AND CALCIUM CHLORIDE: .6; .31; .03; .02 INJECTION, SOLUTION INTRAVENOUS at 17:41

## 2024-01-03 RX ADMIN — SODIUM CHLORIDE, PRESERVATIVE FREE 10 ML: 5 INJECTION INTRAVENOUS at 08:08

## 2024-01-03 RX ADMIN — LISINOPRIL 40 MG: 40 TABLET ORAL at 08:00

## 2024-01-03 RX ADMIN — ACETAMINOPHEN 1000 MG: 500 TABLET ORAL at 21:56

## 2024-01-03 RX ADMIN — Medication 120 MG: at 16:47

## 2024-01-03 RX ADMIN — MIDAZOLAM HYDROCHLORIDE 2 MG: 2 INJECTION, SOLUTION INTRAMUSCULAR; INTRAVENOUS at 16:38

## 2024-01-03 RX ADMIN — HYDROMORPHONE HYDROCHLORIDE 1 MG: 2 INJECTION, SOLUTION INTRAMUSCULAR; INTRAVENOUS; SUBCUTANEOUS at 17:20

## 2024-01-03 RX ADMIN — AMLODIPINE BESYLATE 10 MG: 10 TABLET ORAL at 08:00

## 2024-01-03 RX ADMIN — CARVEDILOL 25 MG: 25 TABLET, FILM COATED ORAL at 21:56

## 2024-01-03 RX ADMIN — CEFAZOLIN SODIUM 3 G: 1 POWDER, FOR SOLUTION INTRAMUSCULAR; INTRAVENOUS at 17:03

## 2024-01-03 RX ADMIN — PHENYLEPHRINE HYDROCHLORIDE 100 MCG: 10 INJECTION INTRAVENOUS at 17:05

## 2024-01-03 RX ADMIN — FENTANYL CITRATE 50 MCG: 50 INJECTION, SOLUTION INTRAMUSCULAR; INTRAVENOUS at 17:16

## 2024-01-03 RX ADMIN — PANTOPRAZOLE SODIUM 40 MG: 40 TABLET, DELAYED RELEASE ORAL at 08:00

## 2024-01-03 RX ADMIN — INSULIN LISPRO 4 UNITS: 100 INJECTION, SOLUTION INTRAVENOUS; SUBCUTANEOUS at 12:14

## 2024-01-03 RX ADMIN — ROCURONIUM BROMIDE 40 MG: 10 INJECTION, SOLUTION INTRAVENOUS at 18:14

## 2024-01-03 RX ADMIN — OXYCODONE HYDROCHLORIDE 5 MG: 5 TABLET ORAL at 01:59

## 2024-01-03 RX ADMIN — ROCURONIUM BROMIDE 80 MG: 10 INJECTION, SOLUTION INTRAVENOUS at 16:59

## 2024-01-03 RX ADMIN — SUGAMMADEX 200 MG: 100 INJECTION, SOLUTION INTRAVENOUS at 19:04

## 2024-01-03 RX ADMIN — PHENYLEPHRINE HYDROCHLORIDE 100 MCG: 10 INJECTION INTRAVENOUS at 17:28

## 2024-01-03 RX ADMIN — PHENYLEPHRINE HYDROCHLORIDE 100 MCG: 10 INJECTION INTRAVENOUS at 17:03

## 2024-01-03 ASSESSMENT — PAIN - FUNCTIONAL ASSESSMENT: PAIN_FUNCTIONAL_ASSESSMENT: PREVENTS OR INTERFERES SOME ACTIVE ACTIVITIES AND ADLS

## 2024-01-03 ASSESSMENT — PAIN DESCRIPTION - ONSET: ONSET: UNABLE TO COMMUNICATE

## 2024-01-03 ASSESSMENT — PAIN SCALES - GENERAL
PAINLEVEL_OUTOF10: 0
PAINLEVEL_OUTOF10: 0
PAINLEVEL_OUTOF10: 7

## 2024-01-03 ASSESSMENT — PAIN DESCRIPTION - DESCRIPTORS: DESCRIPTORS: ACHING

## 2024-01-03 ASSESSMENT — PAIN DESCRIPTION - LOCATION: LOCATION: HIP

## 2024-01-03 ASSESSMENT — PAIN DESCRIPTION - ORIENTATION: ORIENTATION: RIGHT

## 2024-01-03 ASSESSMENT — PAIN DESCRIPTION - PAIN TYPE: TYPE: ACUTE PAIN

## 2024-01-03 ASSESSMENT — PAIN DESCRIPTION - FREQUENCY: FREQUENCY: OTHER (COMMENT)

## 2024-01-03 NOTE — OP NOTE
Operative Note      Patient: Alfredo Rosenberg  YOB: 1955  MRN: [id number]    Date of Procedure: 1/3/2024    Pre-Op Diagnosis Codes:     * Closed fracture of right hip, initial encounter ([facility]) [S72.001A]    Post-Op Diagnosis: Post-Op Diagnosis Codes:     * Closed fracture of right hip, initial encounter ([facility]) [S72.001A]       Procedure(s):  RIGHT TOTAL HIP ARTHROPLASTY ANTERIOR APPROACH    Surgeon(s):  Fernandez Bruner MD    Assistant:   Surgical Assistant: Dipak Rodriguez; Alfredo Ratliff    Anesthesia: General    Estimated Blood Loss (mL): 200     Complications: None    Specimens:   * No specimens in log *    Implants:  Dotson [employer] Nephew 54 mm R3 cup  Dotson [employer] Nephew reflection screw, 6.5 mm x 30 mm  Smith [employer] Nephew reflection screw, 6.5 mm x 35 mm  Smith [employer] Nephew 54 mm x 42 mm OR3O liner  Smith & Nephew size 7 standard polar stem  Smith [employer] Nephew OR3O 42 mm dual mobility insert  Smith [employer] Nephew 28+8 mm Oxinium head    Drains: * No LDAs found *    Findings: Comminuted complete displaced right femoral neck fracture        DETAILS OF PROCEDURE:       The patient was met in the preoperative holding area by myself where site, laterality, and procedure being performed were confirmed with the patient.  The patient was then taken to the operating room where an anesthesia timeout was performed confirming the same.  The patient was then induced on the hospital bed and intubated.  Boots were placed on both feet, and the patient was then moved to the [place] table where all bony prominences were appropriately padded and the right lower extremity was then prepped and draped in a sterile fashion.  A surgical timeout was performed.     We turned our attention to the right leg.  An 12 cm incision was made in the groin crease through skin and subcutaneous tissue..  Electrocautery was used to achieve hemostasis.  The fascia overlying the tensor fascia brandi was incised.  A Cobra retractor was placed over the superior femoral

## 2024-01-03 NOTE — CARE COORDINATION
CM following: Pt is from LTC at [facility]. Plan is for surgery today. Pt can return to LTC at [facility] when medically stable for discharge, no precert needed. CM will continue to follow.  Electronically signed by YUDI Guillory on 1/3/2024 at 1:32 PM  [phone]

## 2024-01-03 NOTE — PLAN OF CARE
Problem: Pain  Goal: Verbalizes/displays adequate comfort level or baseline comfort level  Outcome: Progressing  Note: Ocycodone added to MAR and given per FLACC pain scale     Problem: Safety - Adult  Goal: Free from fall injury  Outcome: Progressing  Note: Pt will remain free of falls for the duration of the shift. TRINA orientation. Pt on bedrest.

## 2024-01-03 NOTE — PLAN OF CARE
Problem: Pain  Goal: Verbalizes/displays adequate comfort level or baseline comfort level  1/3/2024 0727 by Shannon Da Silva, RN  Outcome: Progressing  Pt endorsing pain to R hip. Being treated with PRN pain medication, rest, and frequent repositioning with pillow support for comfort and pressure relief. Pt reports some relief from pain with above interventions.      Problem: Safety - Adult  Goal: Free from fall injury  1/3/2024 0727 by Shannon Da Silva, RN  Outcome: Progressing  All fall precautions in place. Bed locked and in lowest position with alarm on. Overbed table and personal belonings within reach. Call light within reach and patient instructed to use call light for assistance. Non-skid socks on.

## 2024-01-03 NOTE — DISCHARGE INSTRUCTIONS
removed if needed, and x-rays of your hip.    Medications can be refilled at this visit.  If you have any fevers > 101.5, chills, excessive redness and swelling, or foul smelling drainage from your wound, please call Dr. Bruner's office and go to the nearest Emergency Room.      Prescription Medications Postoperative JOSE DANIEL  Name Dosage Frequency Reason # days # pills   Aspirin 81mg BID Prevent blood clots 28 56   Meloxicam 15mg Daily w/ food Pain/swelling 28 28   Zofran 4mg Q8H PRN Nausea 7 21   Colace 100mg BID PRN Prevent constipation 7 14   Tylenol 1000mg Q8H scheduled Pain/swelling 28 84   Oxycodone 5mg Q6H PRN for severe pain Pain/swelling 7 28   Duricef 500mg BID Infection prevention 7 14   Multivitamin 1 tablet Daily Health 28 28   Pepcid 20mg Daily GI distress 28 28   Herbal supplements may be restarted 2 weeks after total hip replacement

## 2024-01-03 NOTE — ANESTHESIA PRE PROCEDURE
97.6 °F (36.4 °C)   TempSrc: Temporal Temporal Temporal Temporal   SpO2: 92% 94% 96% 94%   Weight:       Height:                                                  BP Readings from Last 3 Encounters:   01/03/24 (!) 157/85   01/12/23 133/69   09/16/21 (!) 149/82       NPO Status: Time of last liquid consumption:  (unknown)                        Time of last solid consumption:  (unknown)                        Date of last liquid consumption: 01/02/23                        Date of last solid food consumption: 01/02/23    BMI:   Wt Readings from Last 3 Encounters:   01/01/24 118.9 kg (262 lb 2 oz)   01/11/23 113.3 kg (249 lb 11.2 oz)   09/16/21 116.5 kg (256 lb 13.4 oz)     Body mass index is 33.64 kg/m².    CBC:   Lab Results   Component Value Date/Time    WBC 15.8 01/03/2024 10:08 AM    RBC 4.79 01/03/2024 10:08 AM    HGB 14.1 01/03/2024 10:08 AM    HCT 42.0 01/03/2024 10:08 AM    MCV 87.8 01/03/2024 10:08 AM    RDW 14.7 01/03/2024 10:08 AM     01/03/2024 10:08 AM       CMP:   Lab Results   Component Value Date/Time     01/03/2024 10:08 AM    K 3.6 01/03/2024 10:08 AM     01/03/2024 10:08 AM    CO2 25 01/03/2024 10:08 AM    BUN 28 01/03/2024 10:08 AM    CREATININE 0.8 01/03/2024 10:08 AM    GFRAA >60 09/16/2021 05:47 AM    AGRATIO 1.5 09/14/2021 10:55 AM    LABGLOM >60 01/03/2024 10:08 AM    GLUCOSE 329 01/03/2024 10:08 AM    PROT 7.6 09/15/2021 03:57 AM    CALCIUM 9.2 01/03/2024 10:08 AM    BILITOT 0.4 09/15/2021 03:57 AM    ALKPHOS 71 09/15/2021 03:57 AM    AST 21 09/15/2021 03:57 AM    ALT 18 09/15/2021 03:57 AM       POC Tests:   Recent Labs     01/03/24  1105   POCGLU 297*       Coags:   Lab Results   Component Value Date/Time    PROTIME 15.6 12/30/2023 06:54 PM    INR 1.24 12/30/2023 06:54 PM       HCG (If Applicable): No results found for: \"PREGTESTUR\", \"PREGSERUM\", \"HCG\", \"HCGQUANT\"     ABGs: No results found for: \"PHART\", \"PO2ART\", \"BFY0MFE\", \"FEP4LRK\", \"BEART\", \"M3BFBHCH\"     Type &

## 2024-01-03 NOTE — BRIEF OP NOTE
Brief Postoperative Note      Patient: Alfredo Rosenberg  YOB: 1955  MRN: [id number]    Date of Procedure: 1/3/2024    Pre-Op Diagnosis Codes:     * Closed fracture of right hip, initial encounter ([facility]) [S72.001A]    Post-Op Diagnosis: Post-Op Diagnosis Codes:     * Closed fracture of right hip, initial encounter ([facility]) [S72.001A]       Procedure(s):  RIGHT TOTAL HIP ARTHROPLASTY ANTERIOR APPROACH    Surgeon(s):  Fernandez Bruner MD    Assistant:  Surgical Assistant: Natty Rodriguez Michael    Anesthesia: General    Estimated Blood Loss (mL): 200     Complications: None    Specimens:   * No specimens in log *    Implants:  See op note       Drains: * No LDAs found *    Findings: Comminuted complete displaced right femoral neck fracture      Electronically signed by Fernandez Bruner MD on 1/3/2024 at 6:48 PM

## 2024-01-04 LAB
EST. AVERAGE GLUCOSE BLD GHB EST-MCNC: 177.2 MG/DL
GLUCOSE BLD-MCNC: 267 MG/DL (ref 70–99)
GLUCOSE BLD-MCNC: 273 MG/DL (ref 70–99)
GLUCOSE BLD-MCNC: 290 MG/DL (ref 70–99)
GLUCOSE BLD-MCNC: 309 MG/DL (ref 70–99)
HBA1C MFR BLD: 7.8 %
PERFORMED ON: ABNORMAL

## 2024-01-04 PROCEDURE — 6370000000 HC RX 637 (ALT 250 FOR IP): Performed by: STUDENT IN AN ORGANIZED HEALTH CARE EDUCATION/TRAINING PROGRAM

## 2024-01-04 PROCEDURE — 97166 OT EVAL MOD COMPLEX 45 MIN: CPT

## 2024-01-04 PROCEDURE — 6360000002 HC RX W HCPCS: Performed by: STUDENT IN AN ORGANIZED HEALTH CARE EDUCATION/TRAINING PROGRAM

## 2024-01-04 PROCEDURE — 6370000000 HC RX 637 (ALT 250 FOR IP): Performed by: INTERNAL MEDICINE

## 2024-01-04 PROCEDURE — 97535 SELF CARE MNGMENT TRAINING: CPT

## 2024-01-04 PROCEDURE — 97530 THERAPEUTIC ACTIVITIES: CPT

## 2024-01-04 PROCEDURE — 2580000003 HC RX 258: Performed by: STUDENT IN AN ORGANIZED HEALTH CARE EDUCATION/TRAINING PROGRAM

## 2024-01-04 PROCEDURE — 1200000000 HC SEMI PRIVATE

## 2024-01-04 PROCEDURE — 97162 PT EVAL MOD COMPLEX 30 MIN: CPT

## 2024-01-04 RX ORDER — INSULIN LISPRO 100 [IU]/ML
0-4 INJECTION, SOLUTION INTRAVENOUS; SUBCUTANEOUS NIGHTLY
Status: DISCONTINUED | OUTPATIENT
Start: 2024-01-04 | End: 2024-01-08 | Stop reason: HOSPADM

## 2024-01-04 RX ORDER — INSULIN LISPRO 100 [IU]/ML
5 INJECTION, SOLUTION INTRAVENOUS; SUBCUTANEOUS
Status: DISCONTINUED | OUTPATIENT
Start: 2024-01-04 | End: 2024-01-05

## 2024-01-04 RX ORDER — INSULIN GLARGINE 100 [IU]/ML
6 INJECTION, SOLUTION SUBCUTANEOUS ONCE
Status: DISCONTINUED | OUTPATIENT
Start: 2024-01-04 | End: 2024-01-04

## 2024-01-04 RX ORDER — CEPHALEXIN 250 MG/1
500 CAPSULE ORAL EVERY 6 HOURS SCHEDULED
Status: DISCONTINUED | OUTPATIENT
Start: 2024-01-04 | End: 2024-01-08 | Stop reason: HOSPADM

## 2024-01-04 RX ORDER — INSULIN GLARGINE 100 [IU]/ML
45 INJECTION, SOLUTION SUBCUTANEOUS NIGHTLY
Status: DISCONTINUED | OUTPATIENT
Start: 2024-01-04 | End: 2024-01-05

## 2024-01-04 RX ORDER — INSULIN GLARGINE 100 [IU]/ML
30 INJECTION, SOLUTION SUBCUTANEOUS NIGHTLY
Status: DISCONTINUED | OUTPATIENT
Start: 2024-01-04 | End: 2024-01-04

## 2024-01-04 RX ORDER — INSULIN LISPRO 100 [IU]/ML
0-16 INJECTION, SOLUTION INTRAVENOUS; SUBCUTANEOUS
Status: DISCONTINUED | OUTPATIENT
Start: 2024-01-04 | End: 2024-01-08 | Stop reason: HOSPADM

## 2024-01-04 RX ADMIN — AMLODIPINE BESYLATE 10 MG: 10 TABLET ORAL at 08:06

## 2024-01-04 RX ADMIN — INSULIN GLARGINE 45 UNITS: 100 INJECTION, SOLUTION SUBCUTANEOUS at 20:45

## 2024-01-04 RX ADMIN — CEPHALEXIN 500 MG: 250 CAPSULE ORAL at 18:08

## 2024-01-04 RX ADMIN — SODIUM CHLORIDE 2000 MG: 900 INJECTION INTRAVENOUS at 01:02

## 2024-01-04 RX ADMIN — LISINOPRIL 40 MG: 40 TABLET ORAL at 08:06

## 2024-01-04 RX ADMIN — ASPIRIN 81 MG: 81 TABLET, CHEWABLE ORAL at 20:45

## 2024-01-04 RX ADMIN — ASPIRIN 81 MG: 81 TABLET, CHEWABLE ORAL at 08:06

## 2024-01-04 RX ADMIN — INSULIN LISPRO 5 UNITS: 100 INJECTION, SOLUTION INTRAVENOUS; SUBCUTANEOUS at 18:05

## 2024-01-04 RX ADMIN — ACETAMINOPHEN 1000 MG: 500 TABLET ORAL at 05:32

## 2024-01-04 RX ADMIN — INSULIN LISPRO 2 UNITS: 100 INJECTION, SOLUTION INTRAVENOUS; SUBCUTANEOUS at 08:30

## 2024-01-04 RX ADMIN — INSULIN LISPRO 12 UNITS: 100 INJECTION, SOLUTION INTRAVENOUS; SUBCUTANEOUS at 13:04

## 2024-01-04 RX ADMIN — OXYCODONE HYDROCHLORIDE 5 MG: 5 TABLET ORAL at 20:44

## 2024-01-04 RX ADMIN — ACETAMINOPHEN 1000 MG: 500 TABLET ORAL at 14:02

## 2024-01-04 RX ADMIN — ATORVASTATIN CALCIUM 40 MG: 40 TABLET, FILM COATED ORAL at 08:06

## 2024-01-04 RX ADMIN — METHOCARBAMOL 500 MG: 500 TABLET ORAL at 20:44

## 2024-01-04 RX ADMIN — ACETAMINOPHEN 1000 MG: 500 TABLET ORAL at 20:44

## 2024-01-04 RX ADMIN — METHOCARBAMOL 500 MG: 500 TABLET ORAL at 08:06

## 2024-01-04 RX ADMIN — PANTOPRAZOLE SODIUM 40 MG: 40 TABLET, DELAYED RELEASE ORAL at 05:32

## 2024-01-04 RX ADMIN — SODIUM CHLORIDE, PRESERVATIVE FREE 10 ML: 5 INJECTION INTRAVENOUS at 20:46

## 2024-01-04 RX ADMIN — DULOXETINE HYDROCHLORIDE 60 MG: 60 CAPSULE, DELAYED RELEASE ORAL at 08:06

## 2024-01-04 RX ADMIN — INSULIN LISPRO 5 UNITS: 100 INJECTION, SOLUTION INTRAVENOUS; SUBCUTANEOUS at 08:30

## 2024-01-04 RX ADMIN — METHOCARBAMOL 500 MG: 500 TABLET ORAL at 14:02

## 2024-01-04 RX ADMIN — INSULIN LISPRO 5 UNITS: 100 INJECTION, SOLUTION INTRAVENOUS; SUBCUTANEOUS at 14:05

## 2024-01-04 RX ADMIN — CARVEDILOL 25 MG: 25 TABLET, FILM COATED ORAL at 20:45

## 2024-01-04 RX ADMIN — CARVEDILOL 25 MG: 25 TABLET, FILM COATED ORAL at 08:06

## 2024-01-04 RX ADMIN — SODIUM CHLORIDE 2000 MG: 900 INJECTION INTRAVENOUS at 14:10

## 2024-01-04 RX ADMIN — INSULIN LISPRO 8 UNITS: 100 INJECTION, SOLUTION INTRAVENOUS; SUBCUTANEOUS at 18:05

## 2024-01-04 ASSESSMENT — PAIN DESCRIPTION - ORIENTATION
ORIENTATION: RIGHT
ORIENTATION: RIGHT

## 2024-01-04 ASSESSMENT — PAIN DESCRIPTION - LOCATION
LOCATION: HIP
LOCATION: HIP

## 2024-01-04 ASSESSMENT — PAIN DESCRIPTION - PAIN TYPE: TYPE: SURGICAL PAIN

## 2024-01-04 ASSESSMENT — PAIN DESCRIPTION - ONSET: ONSET: ON-GOING

## 2024-01-04 ASSESSMENT — PAIN DESCRIPTION - FREQUENCY: FREQUENCY: INTERMITTENT

## 2024-01-04 NOTE — CARE COORDINATION
Patient unable to sign IMM form.  Tried to call patients daughter via phone.  Her number had been changed and not updated in patients chart at this time.

## 2024-01-04 NOTE — ANESTHESIA POSTPROCEDURE EVALUATION
Department of Anesthesiology  Postprocedure Note    Patient: Alfredo Rosenberg  MRN: [id number]  YOB: 1955  Date of evaluation: 1/3/2024    Procedure Summary       Date: 01/03/24 Room / Location: [address]    Anesthesia Start: 1638 Anesthesia Stop: 1924    Procedure: RIGHT TOTAL HIP ARTHROPLASTY ANTERIOR APPROACH (Right: Hip) Diagnosis:       Closed fracture of right hip, initial encounter ([facility])      (Closed fracture of right hip, initial encounter ([facility]) [S72.001A])    Surgeons: Fernandez Bruner MD Responsible Provider: Pravin Burnette MD    Anesthesia Type: general ASA Status: 3            Anesthesia Type: No value filed.    Betzy Phase I: Betzy Score: 8    Betzy Phase II:      Anesthesia Post Evaluation    Patient location during evaluation: PACU  Patient participation: complete - patient cannot participate  Level of consciousness: awake  Airway patency: patent  Nausea & Vomiting: no nausea and no vomiting  Cardiovascular status: hemodynamically stable  Respiratory status: acceptable  Hydration status: euvolemic  Multimodal analgesia pain management approach  Pain management: satisfactory to patient    No notable events documented.

## 2024-01-04 NOTE — PLAN OF CARE
Problem: Discharge Planning  Goal: Discharge to home or other facility with appropriate resources  Outcome: Progressing     Problem: Pain  Goal: Verbalizes/displays adequate comfort level or baseline comfort level  Outcome: Progressing     Problem: Safety - Adult  Goal: Free from fall injury  Outcome: Progressing     Problem: Skin/Tissue Integrity  Goal: Absence of new skin breakdown  Description: 1.  Monitor for areas of redness and/or skin breakdown  2.  Assess vascular access sites hourly  3.  Every 4-6 hours minimum:  Change oxygen saturation probe site  4.  Every 4-6 hours:  If on nasal continuous positive airway pressure, respiratory therapy assess nares and determine need for appliance change or resting period.  Outcome: Progressing     Problem: ABCDS Injury Assessment  Goal: Absence of physical injury  Outcome: Progressing     Problem: Gastrointestinal - Adult  Goal: Minimal or absence of nausea and vomiting  Outcome: Progressing  Goal: Maintains or returns to baseline bowel function  Outcome: Progressing  Goal: Maintains adequate nutritional intake  Outcome: Progressing     Problem: Chronic Conditions and Co-morbidities  Goal: Patient's chronic conditions and co-morbidity symptoms are monitored and maintained or improved  Outcome: Progressing

## 2024-01-04 NOTE — CARE COORDINATION
CM following: MANINDER spoke with Lynn with [facility]. MANINDER reports pt need for PT/OT upon return and Lynn will start a precert for skilled services today. Lynn reports that the pt will not need to wait for precert and can come back under LTC benefits when medically stable for discharge. MANINDER will continue to follow.  Electronically signed by YUDI Guillory on 1/4/2024 at 11:35 AM  [phone]

## 2024-01-04 NOTE — PLAN OF CARE
Patient is alert but is non-verbal at time of assessment patient does not have any non-verbal ques indicating he is in any pain or discomfort.Patient vital signs satisfactory after arriving from PACU for a right hip surgery dressing to right hip clean,dry and intact.Patient free from new falls or injury at time of report.

## 2024-01-05 LAB
ANION GAP SERPL CALCULATED.3IONS-SCNC: 13 MMOL/L (ref 3–16)
BUN SERPL-MCNC: 32 MG/DL (ref 7–20)
CALCIUM SERPL-MCNC: 8.7 MG/DL (ref 8.3–10.6)
CHLORIDE SERPL-SCNC: 105 MMOL/L (ref 99–110)
CO2 SERPL-SCNC: 21 MMOL/L (ref 21–32)
CREAT SERPL-MCNC: 1.1 MG/DL (ref 0.8–1.3)
GFR SERPLBLD CREATININE-BSD FMLA CKD-EPI: >60 ML/MIN/{1.73_M2}
GLUCOSE BLD-MCNC: 187 MG/DL (ref 70–99)
GLUCOSE BLD-MCNC: 215 MG/DL (ref 70–99)
GLUCOSE BLD-MCNC: 238 MG/DL (ref 70–99)
GLUCOSE BLD-MCNC: 268 MG/DL (ref 70–99)
GLUCOSE BLD-MCNC: 287 MG/DL (ref 70–99)
GLUCOSE BLD-MCNC: 288 MG/DL (ref 70–99)
GLUCOSE BLD-MCNC: 297 MG/DL (ref 70–99)
GLUCOSE BLD-MCNC: 303 MG/DL (ref 70–99)
GLUCOSE BLD-MCNC: 316 MG/DL (ref 70–99)
GLUCOSE SERPL-MCNC: 325 MG/DL (ref 70–99)
PERFORMED ON: ABNORMAL
POTASSIUM SERPL-SCNC: 3.7 MMOL/L (ref 3.5–5.1)
SODIUM SERPL-SCNC: 139 MMOL/L (ref 136–145)

## 2024-01-05 PROCEDURE — 97530 THERAPEUTIC ACTIVITIES: CPT

## 2024-01-05 PROCEDURE — 6370000000 HC RX 637 (ALT 250 FOR IP): Performed by: STUDENT IN AN ORGANIZED HEALTH CARE EDUCATION/TRAINING PROGRAM

## 2024-01-05 PROCEDURE — 2580000003 HC RX 258: Performed by: STUDENT IN AN ORGANIZED HEALTH CARE EDUCATION/TRAINING PROGRAM

## 2024-01-05 PROCEDURE — 36415 COLL VENOUS BLD VENIPUNCTURE: CPT

## 2024-01-05 PROCEDURE — 2580000003 HC RX 258: Performed by: INTERNAL MEDICINE

## 2024-01-05 PROCEDURE — 80048 BASIC METABOLIC PNL TOTAL CA: CPT

## 2024-01-05 PROCEDURE — 97535 SELF CARE MNGMENT TRAINING: CPT

## 2024-01-05 PROCEDURE — 1200000000 HC SEMI PRIVATE

## 2024-01-05 PROCEDURE — 6370000000 HC RX 637 (ALT 250 FOR IP): Performed by: INTERNAL MEDICINE

## 2024-01-05 RX ORDER — INSULIN GLARGINE 100 [IU]/ML
60 INJECTION, SOLUTION SUBCUTANEOUS NIGHTLY
Status: DISCONTINUED | OUTPATIENT
Start: 2024-01-05 | End: 2024-01-05

## 2024-01-05 RX ORDER — SODIUM CHLORIDE 9 MG/ML
INJECTION, SOLUTION INTRAVENOUS CONTINUOUS
Status: DISCONTINUED | OUTPATIENT
Start: 2024-01-05 | End: 2024-01-07

## 2024-01-05 RX ORDER — INSULIN GLARGINE 100 [IU]/ML
60 INJECTION, SOLUTION SUBCUTANEOUS ONCE
Status: COMPLETED | OUTPATIENT
Start: 2024-01-05 | End: 2024-01-05

## 2024-01-05 RX ORDER — DEXTROSE MONOHYDRATE 100 MG/ML
INJECTION, SOLUTION INTRAVENOUS CONTINUOUS PRN
Status: DISCONTINUED | OUTPATIENT
Start: 2024-01-05 | End: 2024-01-05 | Stop reason: SDUPTHER

## 2024-01-05 RX ORDER — INSULIN GLARGINE 100 [IU]/ML
60 INJECTION, SOLUTION SUBCUTANEOUS NIGHTLY
Status: DISCONTINUED | OUTPATIENT
Start: 2024-01-06 | End: 2024-01-07

## 2024-01-05 RX ORDER — INSULIN LISPRO 100 [IU]/ML
8 INJECTION, SOLUTION INTRAVENOUS; SUBCUTANEOUS
Status: DISCONTINUED | OUTPATIENT
Start: 2024-01-05 | End: 2024-01-06

## 2024-01-05 RX ORDER — GLUCAGON 1 MG/ML
1 KIT INJECTION PRN
Status: DISCONTINUED | OUTPATIENT
Start: 2024-01-05 | End: 2024-01-05 | Stop reason: SDUPTHER

## 2024-01-05 RX ADMIN — INSULIN HUMAN 5 UNITS: 100 INJECTION, SOLUTION PARENTERAL at 11:53

## 2024-01-05 RX ADMIN — OXYCODONE HYDROCHLORIDE 10 MG: 5 TABLET ORAL at 14:22

## 2024-01-05 RX ADMIN — INSULIN LISPRO 8 UNITS: 100 INJECTION, SOLUTION INTRAVENOUS; SUBCUTANEOUS at 08:25

## 2024-01-05 RX ADMIN — CEPHALEXIN 500 MG: 250 CAPSULE ORAL at 06:18

## 2024-01-05 RX ADMIN — INSULIN LISPRO 8 UNITS: 100 INJECTION, SOLUTION INTRAVENOUS; SUBCUTANEOUS at 11:55

## 2024-01-05 RX ADMIN — SODIUM CHLORIDE: 9 INJECTION, SOLUTION INTRAVENOUS at 17:03

## 2024-01-05 RX ADMIN — INSULIN LISPRO 5 UNITS: 100 INJECTION, SOLUTION INTRAVENOUS; SUBCUTANEOUS at 11:54

## 2024-01-05 RX ADMIN — ACETAMINOPHEN 1000 MG: 500 TABLET ORAL at 06:27

## 2024-01-05 RX ADMIN — METHOCARBAMOL 500 MG: 500 TABLET ORAL at 14:22

## 2024-01-05 RX ADMIN — PANTOPRAZOLE SODIUM 40 MG: 40 TABLET, DELAYED RELEASE ORAL at 06:18

## 2024-01-05 RX ADMIN — ATORVASTATIN CALCIUM 40 MG: 40 TABLET, FILM COATED ORAL at 08:24

## 2024-01-05 RX ADMIN — INSULIN GLARGINE 60 UNITS: 100 INJECTION, SOLUTION SUBCUTANEOUS at 16:57

## 2024-01-05 RX ADMIN — INSULIN LISPRO 8 UNITS: 100 INJECTION, SOLUTION INTRAVENOUS; SUBCUTANEOUS at 16:56

## 2024-01-05 RX ADMIN — ASPIRIN 81 MG: 81 TABLET, CHEWABLE ORAL at 08:24

## 2024-01-05 RX ADMIN — METHOCARBAMOL 500 MG: 500 TABLET ORAL at 08:24

## 2024-01-05 RX ADMIN — CEPHALEXIN 500 MG: 250 CAPSULE ORAL at 00:34

## 2024-01-05 RX ADMIN — CARVEDILOL 25 MG: 25 TABLET, FILM COATED ORAL at 08:24

## 2024-01-05 RX ADMIN — LISINOPRIL 40 MG: 40 TABLET ORAL at 08:24

## 2024-01-05 RX ADMIN — INSULIN LISPRO 8 UNITS: 100 INJECTION, SOLUTION INTRAVENOUS; SUBCUTANEOUS at 16:57

## 2024-01-05 RX ADMIN — METHOCARBAMOL 500 MG: 500 TABLET ORAL at 22:19

## 2024-01-05 RX ADMIN — OXYCODONE HYDROCHLORIDE 5 MG: 5 TABLET ORAL at 08:24

## 2024-01-05 RX ADMIN — AMLODIPINE BESYLATE 10 MG: 10 TABLET ORAL at 08:24

## 2024-01-05 RX ADMIN — CEPHALEXIN 500 MG: 250 CAPSULE ORAL at 11:54

## 2024-01-05 RX ADMIN — ACETAMINOPHEN 1000 MG: 500 TABLET ORAL at 14:22

## 2024-01-05 RX ADMIN — CEPHALEXIN 500 MG: 250 CAPSULE ORAL at 16:56

## 2024-01-05 RX ADMIN — OXYCODONE HYDROCHLORIDE 10 MG: 5 TABLET ORAL at 04:20

## 2024-01-05 RX ADMIN — OXYCODONE HYDROCHLORIDE 10 MG: 5 TABLET ORAL at 22:28

## 2024-01-05 RX ADMIN — INSULIN LISPRO 5 UNITS: 100 INJECTION, SOLUTION INTRAVENOUS; SUBCUTANEOUS at 08:25

## 2024-01-05 RX ADMIN — SODIUM CHLORIDE, PRESERVATIVE FREE 5 ML: 5 INJECTION INTRAVENOUS at 21:48

## 2024-01-05 RX ADMIN — ACETAMINOPHEN 1000 MG: 500 TABLET ORAL at 22:19

## 2024-01-05 RX ADMIN — CARVEDILOL 25 MG: 25 TABLET, FILM COATED ORAL at 22:19

## 2024-01-05 RX ADMIN — DULOXETINE HYDROCHLORIDE 60 MG: 60 CAPSULE, DELAYED RELEASE ORAL at 08:24

## 2024-01-05 RX ADMIN — CEPHALEXIN 500 MG: 250 CAPSULE ORAL at 22:19

## 2024-01-05 RX ADMIN — SODIUM CHLORIDE, PRESERVATIVE FREE 10 ML: 5 INJECTION INTRAVENOUS at 08:26

## 2024-01-05 RX ADMIN — INSULIN HUMAN 5 UNITS: 100 INJECTION, SOLUTION PARENTERAL at 08:36

## 2024-01-05 RX ADMIN — ASPIRIN 81 MG: 81 TABLET, CHEWABLE ORAL at 22:19

## 2024-01-05 ASSESSMENT — PAIN DESCRIPTION - FREQUENCY
FREQUENCY: CONTINUOUS
FREQUENCY: CONTINUOUS

## 2024-01-05 ASSESSMENT — PAIN DESCRIPTION - ORIENTATION
ORIENTATION: RIGHT

## 2024-01-05 ASSESSMENT — PAIN DESCRIPTION - PAIN TYPE
TYPE: SURGICAL PAIN

## 2024-01-05 ASSESSMENT — PAIN DESCRIPTION - ONSET
ONSET: ON-GOING

## 2024-01-05 ASSESSMENT — PAIN SCALES - GENERAL
PAINLEVEL_OUTOF10: 4
PAINLEVEL_OUTOF10: 1
PAINLEVEL_OUTOF10: 1
PAINLEVEL_OUTOF10: 7

## 2024-01-05 ASSESSMENT — PAIN DESCRIPTION - LOCATION
LOCATION: HIP

## 2024-01-05 ASSESSMENT — PAIN DESCRIPTION - DESCRIPTORS
DESCRIPTORS: PATIENT UNABLE TO DESCRIBE
DESCRIPTORS: PATIENT UNABLE TO DESCRIBE

## 2024-01-05 NOTE — PLAN OF CARE
Problem: Discharge Planning  Goal: Discharge to home or other facility with appropriate resources  1/5/2024 0918 by Ermias Calvert, RN  Outcome: Progressing   CM/SW on board for d/c planning  Problem: Safety - Adult  Goal: Free from fall injury  1/5/2024 0918 by Ermias Calvert, RN  Outcome: Progressing   All fall precautions in place. Bed locked and in lowest position with alarm on. Overbed table and personal belonings within reach. Call light within reach and patient instructed to use call light for assistance. Non-skid socks on.

## 2024-01-05 NOTE — CONSULTS
Critical care consulted for management of hyperglycemia with insulin gtt. The patient is a diabetic with home dose of insulin as 70 units Lantus,Metformin 1000 mg BID as well as sliding scale insulin. The patient had been given a total of 8 units IV regular insulin in the AM. Discussed with the hospitalist and the critical care attending and they are in agreement with managing the blood sugar with IV and subQ insulin at 5T.  
cooperative, no apparent distress, and appears stated age  Answeres\"Yes\" to all questions   MUSCULOSKELETAL:  There is no redness, warmth, or swelling of the joints.  Full range of motion noted.  Motor strength is 5 out of 5 all extremities bilaterally.  Tone is normal.  Hip right skin over trochanter intact, compartments soft, reflexively DNVI     DATA:    CBC:   Recent Labs     12/30/23  1854 12/31/23  0605   WBC 17.7* 18.1*   HGB 13.5 13.9    207     BMP:    Recent Labs     12/30/23  1854 12/31/23  0605    135*   K 4.3 4.1    99   CO2 20* 21   BUN 19 15   CREATININE 0.9 0.9   GLUCOSE 214* 336*     INR:   Recent Labs     12/30/23 1854   INR 1.24*       Radiology:   XR ELBOW RIGHT (2 VIEWS)   Final Result   Impression:    No acute osseous injury.      Electronically signed by Garcia Newton MD      XR KNEE LEFT (1-2 VIEWS)   Final Result   Impression:    No acute osseous injury.      Electronically signed by Garcia Newton MD      XR KNEE RIGHT (1-2 VIEWS)   Final Result   Impression:    No acute osseous injury.      Electronically signed by Garcia Newton MD      XR HIP BILATERAL W AP PELVIS (2 VIEWS)   Final Result   Impression:   Displaced transcervical right femoral neck fracture.      Electronically signed by Garcia Newton MD      XR FEMUR LEFT (MIN 2 VIEWS)   Final Result   Impression:   Displaced transcervical right femoral neck fracture.      Electronically signed by Garcia Newton MD      XR FEMUR RIGHT (MIN 2 VIEWS)   Final Result   Impression:   Displaced transcervical right femoral neck fracture.      Electronically signed by Garcia Newton MD      CT HEAD WO CONTRAST   Final Result      1.  No acute intracranial hemorrhage or mass effect.   2.  Temporal predominant cerebral volume loss.      Electronically signed by Garcia Newton MD             IMPRESSION/RECOMMENDATIONS:    Assessment: RIGHT HIP Displaced Femoral Neck Fracture    Plan:  1) Difficult clinical situation due to:   Unable obtain consent  Unable to

## 2024-01-05 NOTE — CARE COORDINATION
CM following: Pt is from LTC at [place] and can return at discharge - weekend contact is Lynn at [phone]. Precert is not needed for pt to return, though precert has been started for skilled therapy upon return. Pt will need medical transport at discharge. When cleared for dc reach out to pt's legal guardian - Eloise Fish to inform of discharge. CM will continue to follow.  Electronically signed by YUDI Guillory on 1/5/2024 at 4:29 PM  [phone]

## 2024-01-05 NOTE — PLAN OF CARE
Patient alert but non-communicative following commands at time of assessment.Patient free from falls or injury video monitoring and bed alarm being utilized to prevent patient injury.Patient medicated with PRN pain medication for right hip pain as per MAR.

## 2024-01-06 LAB
GLUCOSE BLD-MCNC: 264 MG/DL (ref 70–99)
GLUCOSE BLD-MCNC: 294 MG/DL (ref 70–99)
GLUCOSE BLD-MCNC: 296 MG/DL (ref 70–99)
GLUCOSE BLD-MCNC: 314 MG/DL (ref 70–99)
PERFORMED ON: ABNORMAL

## 2024-01-06 PROCEDURE — 6370000000 HC RX 637 (ALT 250 FOR IP): Performed by: INTERNAL MEDICINE

## 2024-01-06 PROCEDURE — 6370000000 HC RX 637 (ALT 250 FOR IP): Performed by: STUDENT IN AN ORGANIZED HEALTH CARE EDUCATION/TRAINING PROGRAM

## 2024-01-06 PROCEDURE — 1200000000 HC SEMI PRIVATE

## 2024-01-06 RX ORDER — INSULIN LISPRO 100 [IU]/ML
10 INJECTION, SOLUTION INTRAVENOUS; SUBCUTANEOUS
Status: DISCONTINUED | OUTPATIENT
Start: 2024-01-06 | End: 2024-01-08 | Stop reason: HOSPADM

## 2024-01-06 RX ADMIN — INSULIN GLARGINE 60 UNITS: 100 INJECTION, SOLUTION SUBCUTANEOUS at 21:39

## 2024-01-06 RX ADMIN — ACETAMINOPHEN 1000 MG: 500 TABLET ORAL at 21:38

## 2024-01-06 RX ADMIN — INSULIN LISPRO 10 UNITS: 100 INJECTION, SOLUTION INTRAVENOUS; SUBCUTANEOUS at 12:36

## 2024-01-06 RX ADMIN — METHOCARBAMOL 500 MG: 500 TABLET ORAL at 15:37

## 2024-01-06 RX ADMIN — INSULIN LISPRO 12 UNITS: 100 INJECTION, SOLUTION INTRAVENOUS; SUBCUTANEOUS at 12:37

## 2024-01-06 RX ADMIN — LISINOPRIL 40 MG: 40 TABLET ORAL at 10:33

## 2024-01-06 RX ADMIN — ASPIRIN 81 MG: 81 TABLET, CHEWABLE ORAL at 21:39

## 2024-01-06 RX ADMIN — AMLODIPINE BESYLATE 10 MG: 10 TABLET ORAL at 10:33

## 2024-01-06 RX ADMIN — OXYCODONE HYDROCHLORIDE 10 MG: 5 TABLET ORAL at 18:54

## 2024-01-06 RX ADMIN — CEPHALEXIN 500 MG: 250 CAPSULE ORAL at 06:25

## 2024-01-06 RX ADMIN — OXYCODONE HYDROCHLORIDE 10 MG: 5 TABLET ORAL at 06:25

## 2024-01-06 RX ADMIN — ACETAMINOPHEN 1000 MG: 500 TABLET ORAL at 06:25

## 2024-01-06 RX ADMIN — CARVEDILOL 25 MG: 25 TABLET, FILM COATED ORAL at 21:39

## 2024-01-06 RX ADMIN — INSULIN LISPRO 10 UNITS: 100 INJECTION, SOLUTION INTRAVENOUS; SUBCUTANEOUS at 19:01

## 2024-01-06 RX ADMIN — CARVEDILOL 25 MG: 25 TABLET, FILM COATED ORAL at 10:33

## 2024-01-06 RX ADMIN — ACETAMINOPHEN 1000 MG: 500 TABLET ORAL at 13:25

## 2024-01-06 RX ADMIN — ASPIRIN 81 MG: 81 TABLET, CHEWABLE ORAL at 10:33

## 2024-01-06 RX ADMIN — METHOCARBAMOL 500 MG: 500 TABLET ORAL at 21:39

## 2024-01-06 RX ADMIN — ATORVASTATIN CALCIUM 40 MG: 40 TABLET, FILM COATED ORAL at 10:33

## 2024-01-06 RX ADMIN — CEPHALEXIN 500 MG: 250 CAPSULE ORAL at 13:25

## 2024-01-06 RX ADMIN — CEPHALEXIN 500 MG: 250 CAPSULE ORAL at 18:59

## 2024-01-06 RX ADMIN — INSULIN LISPRO 10 UNITS: 100 INJECTION, SOLUTION INTRAVENOUS; SUBCUTANEOUS at 18:53

## 2024-01-06 RX ADMIN — METHOCARBAMOL 500 MG: 500 TABLET ORAL at 10:33

## 2024-01-06 RX ADMIN — DULOXETINE HYDROCHLORIDE 60 MG: 60 CAPSULE, DELAYED RELEASE ORAL at 10:33

## 2024-01-06 RX ADMIN — INSULIN HUMAN 5 UNITS: 100 INJECTION, SOLUTION PARENTERAL at 10:36

## 2024-01-06 ASSESSMENT — PAIN SCALES - GENERAL: PAINLEVEL_OUTOF10: 8

## 2024-01-06 NOTE — PLAN OF CARE
Problem: Discharge Planning  Goal: Discharge to home or other facility with appropriate resources  Outcome: Progressing   Case Management is consulted for discharge.      Problem: Pain  Goal: Verbalizes/displays adequate comfort level or baseline comfort level  Outcome: Progressing   Pain medications are being managed by the MAR.      Problem: Safety - Adult  Goal: Free from fall injury  Outcome: Progressing   Patient has all standard fall precautions in place.

## 2024-01-07 LAB
GLUCOSE BLD-MCNC: 225 MG/DL (ref 70–99)
GLUCOSE BLD-MCNC: 225 MG/DL (ref 70–99)
GLUCOSE BLD-MCNC: 263 MG/DL (ref 70–99)
GLUCOSE BLD-MCNC: 333 MG/DL (ref 70–99)
PERFORMED ON: ABNORMAL

## 2024-01-07 PROCEDURE — 6370000000 HC RX 637 (ALT 250 FOR IP): Performed by: STUDENT IN AN ORGANIZED HEALTH CARE EDUCATION/TRAINING PROGRAM

## 2024-01-07 PROCEDURE — 6370000000 HC RX 637 (ALT 250 FOR IP): Performed by: INTERNAL MEDICINE

## 2024-01-07 PROCEDURE — 1200000000 HC SEMI PRIVATE

## 2024-01-07 PROCEDURE — 51798 US URINE CAPACITY MEASURE: CPT

## 2024-01-07 RX ORDER — TAMSULOSIN HYDROCHLORIDE 0.4 MG/1
0.4 CAPSULE ORAL DAILY
Status: DISCONTINUED | OUTPATIENT
Start: 2024-01-07 | End: 2024-01-08 | Stop reason: HOSPADM

## 2024-01-07 RX ORDER — INSULIN GLARGINE 100 [IU]/ML
20 INJECTION, SOLUTION SUBCUTANEOUS DAILY
Status: DISCONTINUED | OUTPATIENT
Start: 2024-01-07 | End: 2024-01-08

## 2024-01-07 RX ORDER — INSULIN GLARGINE 100 [IU]/ML
50 INJECTION, SOLUTION SUBCUTANEOUS NIGHTLY
Status: DISCONTINUED | OUTPATIENT
Start: 2024-01-07 | End: 2024-01-08 | Stop reason: HOSPADM

## 2024-01-07 RX ADMIN — INSULIN GLARGINE 50 UNITS: 100 INJECTION, SOLUTION SUBCUTANEOUS at 21:46

## 2024-01-07 RX ADMIN — ASPIRIN 81 MG: 81 TABLET, CHEWABLE ORAL at 21:50

## 2024-01-07 RX ADMIN — ACETAMINOPHEN 1000 MG: 500 TABLET ORAL at 14:17

## 2024-01-07 RX ADMIN — INSULIN LISPRO 10 UNITS: 100 INJECTION, SOLUTION INTRAVENOUS; SUBCUTANEOUS at 12:17

## 2024-01-07 RX ADMIN — CEPHALEXIN 500 MG: 250 CAPSULE ORAL at 18:27

## 2024-01-07 RX ADMIN — CEPHALEXIN 500 MG: 250 CAPSULE ORAL at 05:15

## 2024-01-07 RX ADMIN — DULOXETINE HYDROCHLORIDE 60 MG: 60 CAPSULE, DELAYED RELEASE ORAL at 08:47

## 2024-01-07 RX ADMIN — CEPHALEXIN 500 MG: 250 CAPSULE ORAL at 00:17

## 2024-01-07 RX ADMIN — INSULIN LISPRO 5 UNITS: 100 INJECTION, SOLUTION INTRAVENOUS; SUBCUTANEOUS at 12:18

## 2024-01-07 RX ADMIN — CEPHALEXIN 500 MG: 250 CAPSULE ORAL at 11:38

## 2024-01-07 RX ADMIN — ACETAMINOPHEN 1000 MG: 500 TABLET ORAL at 21:47

## 2024-01-07 RX ADMIN — ATORVASTATIN CALCIUM 40 MG: 40 TABLET, FILM COATED ORAL at 08:47

## 2024-01-07 RX ADMIN — TAMSULOSIN HYDROCHLORIDE 0.4 MG: 0.4 CAPSULE ORAL at 11:38

## 2024-01-07 RX ADMIN — CARVEDILOL 25 MG: 25 TABLET, FILM COATED ORAL at 21:49

## 2024-01-07 RX ADMIN — METHOCARBAMOL 500 MG: 500 TABLET ORAL at 21:47

## 2024-01-07 RX ADMIN — INSULIN LISPRO 4 UNITS: 100 INJECTION, SOLUTION INTRAVENOUS; SUBCUTANEOUS at 08:44

## 2024-01-07 RX ADMIN — LISINOPRIL 40 MG: 40 TABLET ORAL at 08:47

## 2024-01-07 RX ADMIN — ASPIRIN 81 MG: 81 TABLET, CHEWABLE ORAL at 08:47

## 2024-01-07 RX ADMIN — INSULIN GLARGINE 20 UNITS: 100 INJECTION, SOLUTION SUBCUTANEOUS at 11:38

## 2024-01-07 RX ADMIN — OXYCODONE HYDROCHLORIDE 5 MG: 5 TABLET ORAL at 21:47

## 2024-01-07 RX ADMIN — METHOCARBAMOL 500 MG: 500 TABLET ORAL at 08:47

## 2024-01-07 RX ADMIN — INSULIN LISPRO 10 UNITS: 100 INJECTION, SOLUTION INTRAVENOUS; SUBCUTANEOUS at 18:29

## 2024-01-07 RX ADMIN — AMLODIPINE BESYLATE 10 MG: 10 TABLET ORAL at 08:47

## 2024-01-07 RX ADMIN — CARVEDILOL 25 MG: 25 TABLET, FILM COATED ORAL at 08:47

## 2024-01-07 RX ADMIN — ACETAMINOPHEN 1000 MG: 500 TABLET ORAL at 05:14

## 2024-01-07 RX ADMIN — INSULIN LISPRO 10 UNITS: 100 INJECTION, SOLUTION INTRAVENOUS; SUBCUTANEOUS at 08:58

## 2024-01-07 RX ADMIN — METHOCARBAMOL 500 MG: 500 TABLET ORAL at 14:17

## 2024-01-07 RX ADMIN — INSULIN LISPRO 8 UNITS: 100 INJECTION, SOLUTION INTRAVENOUS; SUBCUTANEOUS at 18:53

## 2024-01-07 RX ADMIN — PANTOPRAZOLE SODIUM 40 MG: 40 TABLET, DELAYED RELEASE ORAL at 05:14

## 2024-01-07 ASSESSMENT — PAIN DESCRIPTION - ONSET: ONSET: ON-GOING

## 2024-01-07 ASSESSMENT — PAIN DESCRIPTION - DESCRIPTORS: DESCRIPTORS: OTHER (COMMENT)

## 2024-01-07 ASSESSMENT — PAIN DESCRIPTION - PAIN TYPE: TYPE: ACUTE PAIN;SURGICAL PAIN

## 2024-01-07 ASSESSMENT — PAIN - FUNCTIONAL ASSESSMENT: PAIN_FUNCTIONAL_ASSESSMENT: PREVENTS OR INTERFERES SOME ACTIVE ACTIVITIES AND ADLS

## 2024-01-07 ASSESSMENT — PAIN DESCRIPTION - LOCATION: LOCATION: HIP

## 2024-01-07 NOTE — PLAN OF CARE
Problem: Pain  Goal: Verbalizes/displays adequate comfort level or baseline comfort level  Outcome: Progressing   Pain medications are being managed by the MAR.      Problem: Discharge Planning  Goal: Discharge to home or other facility with appropriate resources  Outcome: Progressing  Flowsheets (Taken 1/7/2024 0811)  Discharge to home or other facility with appropriate resources: Identify barriers to discharge with patient and caregiver   Case Management is working on discharge.      Problem: ABCDS Injury Assessment  Goal: Absence of physical injury  Outcome: Progressing    Patient remains free of injury.

## 2024-01-08 VITALS
OXYGEN SATURATION: 95 % | BODY MASS INDEX: 30.95 KG/M2 | HEIGHT: 74 IN | SYSTOLIC BLOOD PRESSURE: 136 MMHG | RESPIRATION RATE: 16 BRPM | DIASTOLIC BLOOD PRESSURE: 84 MMHG | HEART RATE: 72 BPM | TEMPERATURE: 98.7 F | WEIGHT: 241.18 LBS

## 2024-01-08 LAB
GLUCOSE BLD-MCNC: 186 MG/DL (ref 70–99)
GLUCOSE BLD-MCNC: 211 MG/DL (ref 70–99)
PERFORMED ON: ABNORMAL
PERFORMED ON: ABNORMAL

## 2024-01-08 PROCEDURE — 6370000000 HC RX 637 (ALT 250 FOR IP): Performed by: INTERNAL MEDICINE

## 2024-01-08 PROCEDURE — 6370000000 HC RX 637 (ALT 250 FOR IP): Performed by: STUDENT IN AN ORGANIZED HEALTH CARE EDUCATION/TRAINING PROGRAM

## 2024-01-08 RX ORDER — CEPHALEXIN 500 MG/1
500 CAPSULE ORAL 4 TIMES DAILY
Qty: 13 CAPSULE | Refills: 0 | Status: SHIPPED | OUTPATIENT
Start: 2024-01-08 | End: 2024-01-12

## 2024-01-08 RX ORDER — INSULIN GLARGINE 100 [IU]/ML
10 INJECTION, SOLUTION SUBCUTANEOUS ONCE
Status: COMPLETED | OUTPATIENT
Start: 2024-01-08 | End: 2024-01-08

## 2024-01-08 RX ORDER — INSULIN GLARGINE 100 [IU]/ML
30 INJECTION, SOLUTION SUBCUTANEOUS DAILY
Status: DISCONTINUED | OUTPATIENT
Start: 2024-01-09 | End: 2024-01-08 | Stop reason: HOSPADM

## 2024-01-08 RX ORDER — INSULIN GLARGINE 100 [IU]/ML
30 INJECTION, SOLUTION SUBCUTANEOUS DAILY
Qty: 10 ML | Refills: 3 | Status: SHIPPED | OUTPATIENT
Start: 2024-01-09

## 2024-01-08 RX ORDER — TAMSULOSIN HYDROCHLORIDE 0.4 MG/1
0.4 CAPSULE ORAL DAILY
Qty: 30 CAPSULE | Refills: 3 | Status: SHIPPED | OUTPATIENT
Start: 2024-01-09

## 2024-01-08 RX ORDER — ASPIRIN 81 MG/1
81 TABLET, CHEWABLE ORAL 2 TIMES DAILY
Qty: 46 TABLET | Refills: 0 | Status: SHIPPED | OUTPATIENT
Start: 2024-01-08 | End: 2024-01-31

## 2024-01-08 RX ADMIN — INSULIN GLARGINE 10 UNITS: 100 INJECTION, SOLUTION SUBCUTANEOUS at 11:33

## 2024-01-08 RX ADMIN — ACETAMINOPHEN 1000 MG: 500 TABLET ORAL at 06:29

## 2024-01-08 RX ADMIN — LISINOPRIL 40 MG: 40 TABLET ORAL at 09:20

## 2024-01-08 RX ADMIN — CEPHALEXIN 500 MG: 250 CAPSULE ORAL at 01:06

## 2024-01-08 RX ADMIN — INSULIN LISPRO 10 UNITS: 100 INJECTION, SOLUTION INTRAVENOUS; SUBCUTANEOUS at 09:19

## 2024-01-08 RX ADMIN — PANTOPRAZOLE SODIUM 40 MG: 40 TABLET, DELAYED RELEASE ORAL at 06:29

## 2024-01-08 RX ADMIN — METHOCARBAMOL 500 MG: 500 TABLET ORAL at 09:20

## 2024-01-08 RX ADMIN — INSULIN LISPRO 10 UNITS: 100 INJECTION, SOLUTION INTRAVENOUS; SUBCUTANEOUS at 11:33

## 2024-01-08 RX ADMIN — ACETAMINOPHEN 1000 MG: 500 TABLET ORAL at 14:39

## 2024-01-08 RX ADMIN — AMLODIPINE BESYLATE 10 MG: 10 TABLET ORAL at 09:20

## 2024-01-08 RX ADMIN — ATORVASTATIN CALCIUM 40 MG: 40 TABLET, FILM COATED ORAL at 09:20

## 2024-01-08 RX ADMIN — DULOXETINE HYDROCHLORIDE 60 MG: 60 CAPSULE, DELAYED RELEASE ORAL at 09:20

## 2024-01-08 RX ADMIN — CARVEDILOL 25 MG: 25 TABLET, FILM COATED ORAL at 09:20

## 2024-01-08 RX ADMIN — METHOCARBAMOL 500 MG: 500 TABLET ORAL at 14:39

## 2024-01-08 RX ADMIN — CEPHALEXIN 500 MG: 250 CAPSULE ORAL at 06:29

## 2024-01-08 RX ADMIN — CEPHALEXIN 500 MG: 250 CAPSULE ORAL at 11:50

## 2024-01-08 RX ADMIN — TAMSULOSIN HYDROCHLORIDE 0.4 MG: 0.4 CAPSULE ORAL at 09:20

## 2024-01-08 RX ADMIN — ASPIRIN 81 MG: 81 TABLET, CHEWABLE ORAL at 09:22

## 2024-01-08 RX ADMIN — INSULIN GLARGINE 20 UNITS: 100 INJECTION, SOLUTION SUBCUTANEOUS at 09:20

## 2024-01-08 RX ADMIN — INSULIN LISPRO 4 UNITS: 100 INJECTION, SOLUTION INTRAVENOUS; SUBCUTANEOUS at 09:19

## 2024-01-08 NOTE — PLAN OF CARE
Problem: Discharge Planning  Goal: Discharge to home or other facility with appropriate resources  Outcome: Progressing    Problem: Pain  Goal: Verbalizes/displays adequate comfort level or baseline comfort level  Outcome: Progressing     Problem: Safety - Adult  Goal: Free from fall injury  Outcome: Progressing    Fall risk precaution in place. Bed is locked and in lowest position. 2/4 side rails are up. Call light with in reach. Fall risk bracelet in place, non slip socks on.frequent check on patient. free from falls at this time.will continue to monitor.       Problem: Skin/Tissue Integrity  Goal: Absence of new skin breakdown  Description: 1.  Monitor for areas of redness and/or skin breakdown  2.  Assess vascular access sites hourly  3.  Every 4-6 hours minimum:  Change oxygen saturation probe site  4.  Every 4-6 hours:  If on nasal continuous positive airway pressure, respiratory therapy assess nares and determine need for appliance change or resting period.  Outcome: Progressing     Problem: ABCDS Injury Assessment  Goal: Absence of physical injury  Outcome: Progressing     Problem: Gastrointestinal - Adult  Goal: Minimal or absence of nausea and vomiting  Outcome: Progressing     Problem: Gastrointestinal - Adult  Goal: Maintains or returns to baseline bowel function  Outcome: Progressing     Problem: Gastrointestinal - Adult  Goal: Maintains adequate nutritional intake  Outcome: Progressing

## 2024-01-08 NOTE — CARE COORDINATION
Case Management Assessment            Discharge Note                    Date / Time of Note: 1/8/2024 1:16 PM                  Discharge Note Completed by: YUDI Guillory    Patient Name: Alfredo Rosenberg   YOB: 1955  Diagnosis: Closed fracture of neck of right femur, initial encounter ([facility]) [S72.001A]  Fall at home, initial encounter [W19.XXXA, Y92.009]   Date / Time: 12/30/2023  2:40 PM    Current PCP: Wong Guzman MD  Clinic patient: No    Hospitalization in the last 30 days: No       Advance Directives:  Code Status: Full Code  [place] DNR form completed and on chart: Not Indicated    Financial:  Payor: [employer] / Plan: [employer] / Product Type: *No Product type* /      Pharmacy:    [address] [facility] [phone] - F [phone]  [address] [facility] [address]  Phone: [phone] Fax: [phone]    [facility] [address] [place] [address] -  [phone] - F [phone]  [address] [facility] [address]  Phone: [phone] Fax: [phone]      Assistance purchasing medications?: Potential Assistance Purchasing Medications: No  Assistance provided by Case Management: None at this time    Does patient want to participate in local refill/ meds to beds program?:      Meds To Beds General Rules:  1. Can ONLY be done Monday- Friday between 8:30am-5pm  2. Prescription(s) must be in pharmacy by 3pm to be filled same day  3.Copy of patient's insurance/ prescription drug card and patient face sheet must be sent along with the prescription(s)  4. Cost of Rx cannot be added to hospital bill. If financial assistance is needed, please contact unit  or ;  or  CANNOT provide pharmacy voucher for patients co-pays  5. Patients can then  the prescription on their way out of the hospital at

## 2024-01-08 NOTE — PROGRESS NOTES
V2.0    [facility] Progress Note      Name:  Alfredo Rosenberg /Age/Sex: 1955  (68 y.o. male)   MRN & CSN:  [id number] & [id number] Encounter Date/Time: 2024 11:40 AM EST   Location:  [facility] PCP: Wong Guzman MD     Attending:Theresa Olivera MD       Hospital Day: 4    Assessment and Recommendations   Alfredo Rosenberg is a 68 y.o. male with pmh of CVA, dementia, diabetes, hypertension who presents with Fall at home, initial encounter with hip fracture      Plan:     Displaced right femoral neck fracture  -Ortho consulted.  Possible surgery in 1 to 2 days.  Ortho to take consent from the guardian.  Per chart review guardian agreeable to surgery  -Continue pain medications    Hypertension  -Monitor blood pressure and continue home medications(Coreg, amlodipine, lisinopril)  -As needed iv hydralazine for uncontrolled hypertension    Diabetes mellitus-blood sugars on the higher side  -Continue Lantus with sliding scale  -Diet switch to carb control    Remote history of CVA with dementia and residual aphasia  -Continue home Cymbalta and statin    Diet ADULT DIET; Regular   DVT Prophylaxis [x] Lovenox, []  Heparin, [] SCDs, [] Ambulation,  [] Eliquis, [] Xarelto  [] Coumadin   Code Status Full Code   Disposition From: Long-term care  Expected Disposition: Long-term care  Estimated Date of Discharge: 1 to 2 days  Patient requires continued admission due to surgery   Surrogate Decision Maker/ LEEANN torre      Personally reviewed Lab Studies and Imaging     Discussed management of the case with management who discussed with guardian and agreeable for surgery        Imaging that was interpreted personally includes right femur and results fracture    Drugs that require monitoring for toxicity include hydralazine IV and the method of monitoring was blood pressure  -Lantus blood sugars monitor      Subjective:     Chief Complaint: Fall    Alfredo Rosenberg is a 68 y.o. male who presents with hip 
    V2.0    [facility] [place]      Name:  Alfredo Rosenberg /Age/Sex: 1955  (68 y.o. male)   MRN & CSN:  [id number] & [id number] Encounter Date/Time: 2024 11:40 AM EST   Location:  [address] [place] [address] PCP: Wong Guzman MD     Attending:Theresa Olivera MD       Hospital Day: 9    Assessment and Recommendations   Alfredo Rosenberg is a 68 y.o. male with pmh of CVA, dementia, diabetes, hypertension who presents with Fall at home, initial encounter with hip fracture      Plan:     Displaced right femoral neck fracture-s/p arthroplasty 1/3/2022  -Continue pain medications    Hypertension  -Monitor blood pressure and continue home medications(Coreg, amlodipine, lisinopril)  -As needed iv hydralazine for uncontrolled hypertension    Diabetes mellitus-better controlled  -Add a.m. Lantus to night dose of Lantus as patient is getting very high doses of Lantus and absorption will be poor.  Continue Premeal insulin  -Diet switch to carb control  -A1c 7.8    Retention of urine  -Bladder scan showed greater than 600 mL s/p straight cath.   -Add Flomax    Remote history of CVA with dementia and residual aphasia  -Continue home Cymbalta and statin    Diet ADULT DIET; Regular; 4 carb choices (60 gm/meal)   DVT Prophylaxis [x] Lovenox, []  Heparin, [] SCDs, [] Ambulation,  [] Eliquis, [] Xarelto  [] Coumadin   Code Status Full Code   Disposition From: Long-term care  Expected Disposition: Long-term care  Estimated Date of Discharge: 1 to 2 days  Patient requires continued admission uncontrolled diabetes.  Possible discharge   Surrogate Decision Maker/ LEEANN torre      Personally reviewed Lab Studies and Imaging     Discussed management of the case with management -discharge to nursing home after surgery  Discussed management of the case with Ortho- they would like blood sugars less than 180      Imaging that was interpreted personally includes right femur and results fracture    Drugs that require monitoring for 
    V2.0    [facility] Progress Note      Name:  Alfredo Rosenberg /Age/Sex: 1955  (68 y.o. male)   MRN & CSN:  [id number] & [id number] Encounter Date/Time: 1/3/2024 11:40 AM EST   Location:  [facility] [place] PCP: Wong Guzman MD     Attending:Theresa Olivera MD       Hospital Day: 5    Assessment and Recommendations   Alfredo Rosenberg is a 68 y.o. male with pmh of CVA, dementia, diabetes, hypertension who presents with Fall at home, initial encounter with hip fracture      Plan:     Displaced right femoral neck fracture-for hip arthroplasty today  -Continue pain medications    Hypertension  -Monitor blood pressure and continue home medications(Coreg, amlodipine, lisinopril)  -As needed iv hydralazine for uncontrolled hypertension    Diabetes mellitus-blood sugars on the higher side.  Avoid adjusting diabetic medication as patient is n.p.o.  -Continue Lantus with sliding scale  -Diet switch to carb control  -A1c pending    Remote history of CVA with dementia and residual aphasia  -Continue home Cymbalta and statin    Diet Diet NPO Exceptions are: Ice Chips, Sips of Water with Meds   DVT Prophylaxis [x] Lovenox, []  Heparin, [] SCDs, [] Ambulation,  [] Eliquis, [] Xarelto  [] Coumadin   Code Status Full Code   Disposition From: Long-term care  Expected Disposition: Long-term care  Estimated Date of Discharge: 1 to 2 days  Patient requires continued admission due to surgery   Surrogate Decision Maker/ LEEANN torre      Personally reviewed Lab Studies and Imaging     Discussed management of the case with management -discharge to nursing home after surgery      Imaging that was interpreted personally includes right femur and results fracture    Drugs that require monitoring for toxicity include hydralazine IV and the method of monitoring was blood pressure  -Lantus blood sugars monitor      Subjective:     Chief Complaint: Fall    Alfredo Rosenberg is a 68 y.o. male who presents with hip fracture    Patient has 
    V2.0    [facility] [place]      Name:  Alfredo Rosenberg /Age/Sex: 1955  (68 y.o. male)   MRN & CSN:  [id number] & [id number] Encounter Date/Time: 2024 11:40 AM EST   Location:  [address] [place] [address] PCP: Wong Guzman MD     Attending:Theresa Olivera MD       Hospital Day: 6    Assessment and Recommendations   Alfredo Rosenberg is a 68 y.o. male with pmh of CVA, dementia, diabetes, hypertension who presents with Fall at home, initial encounter with hip fracture      Plan:     Displaced right femoral neck fracture-s/p arthroplasty 1/3/2022  -Continue pain medications    Hypertension  -Monitor blood pressure and continue home medications(Coreg, amlodipine, lisinopril)  -As needed iv hydralazine for uncontrolled hypertension    Diabetes mellitus-uncontrolled  -Increase dose of Lantus with Premeal insulin.  Ortho once blood sugar less than 180.  Will give 3 units of IV insulin to decrease blood sugar  -Diet switch to carb control  -A1c 7.8    Remote history of CVA with dementia and residual aphasia  -Continue home Cymbalta and statin    Diet ADULT DIET; Regular   DVT Prophylaxis [x] Lovenox, []  Heparin, [] SCDs, [] Ambulation,  [] Eliquis, [] Xarelto  [] Coumadin   Code Status Full Code   Disposition From: Long-term care  Expected Disposition: Long-term care  Estimated Date of Discharge: 1 to 2 days  Patient requires continued admission due to surgery   Surrogate Decision Maker/ LEEANN torre      Personally reviewed Lab Studies and Imaging     Discussed management of the case with management -discharge to nursing home after surgery  Discussed management of the case with Ortho- they would like blood sugars less than 18       Imaging that was interpreted personally includes right femur and results fracture    Drugs that require monitoring for toxicity include hydralazine IV and the method of monitoring was blood pressure  -Lantus and Iv insulin - blood sugars monitor      Subjective:     Chief Complaint: 
4 Eyes Skin Assessment     NAME:  Alfredo Rosenberg  YOB: 1955  MEDICAL RECORD NUMBER:  [id number]    The patient is being assessed for  Admission    I agree that at least one RN has performed a thorough Head to Toe Skin Assessment on the patient. ALL assessment sites listed below have been assessed.      Areas assessed by both nurses:    Head, Face, Ears, Shoulders, Back, Chest, Arms, Elbows, Hands, Sacrum. Buttock, Coccyx, Ischium, Legs. Feet and Heels, and Under Medical Devices         Does the Patient have a Wound? No noted wound(s)     * small abrasions noted to bilateral elbows, excoriation noted to L groin.   Jose De Jesus Prevention initiated by RN: Yes  Wound Care Orders initiated by RN: No    Pressure Injury (Stage 3,4, Unstageable, DTI, NWPT, and Complex wounds) if present, place Wound referral order by RN under : No    New Ostomies, if present place, Ostomy referral order under : No     Nurse 1 eSignature: Electronically signed by Mary Navarro RN on 12/30/23 at 10:19 PM EST    **SHARE this note so that the co-signing nurse can place an eSignature**    Nurse 2 eSignature: Electronically signed by Mary Thorne RN on 12/30/23 at 10:22 PM EST  
APRN notified by RN of lack of urinary output overnight  -bladder scan per RN was 696ml  -striaght cath x1 now  -monitor I&O  -repeat bladder scan in 6 hours. If residual > 400, notify provider for consideration of urinary catheter placement vs repeat st cath    Aby Ngo, APRN - NP   
Attempted to gain a second PIV access for surgery without success.     
Bedside report done with Aby. GUTIERREZ. TRINA orientation d/t pt non verbal. Pt pulled his IV out. Will place a new IV. Pt planned for surgery in the morning.   
Clinical Pharmacy Progress Note  Medication History     Admit Date: 12/30/2023    List of of current medications patient is taking is complete. Home Medication list in EPIC updated to reflect changes noted below.    Source of information: medication list from [facility]; list dated 12/31/23    Changes made to medication list:   Medications removed: (include reason, ex: therapy completed, patient no longer taking, etc.)  Donepezil - not on facility med list  Riboflavin - not on facility med list  Hydralazine - not on facility med list   Hydroxyzine - not on facility med list   Topiramate - not on facility med list  Mvi   Omeprazole - not on facility med list  Medications added:   Pioglitazone 15mg po daily  Divalproex DR 250mg po BID  Risperidone 0.5mg po nightly   Docusate   Novolog sliding scale  Acetaminophen  Miralax   Medication doses adjusted:   Dicyclomine-->20mg po ACHS  Lantus-->70units nightly   Vitamin D-->1000units daily     Current Outpatient Medications   Medication Instructions    acetaminophen (TYLENOL) 650 mg, Oral, EVERY 6 HOURS PRN    amLODIPine (NORVASC) 10 mg, Oral, DAILY    atorvastatin (LIPITOR) 40 mg, Oral, DAILY    carvedilol (COREG) 25 mg, Oral, 2 TIMES DAILY    dicyclomine (BENTYL) 20 mg, Oral, 4 TIMES DAILY BEFORE MEALS & NIGHTLY    divalproex (DEPAKOTE) 250 mg, Oral, 2 times daily    docusate sodium (COLACE) 100 mg, Oral, DAILY BEFORE BREAKFAST    DULoxetine (CYMBALTA) 60 mg, Oral, DAILY    insulin aspart (NOVOLOG) 0-10 Units, SubCUTAneous, 4 TIMES DAILY BEFORE MEALS & NIGHTLY, Inject as per sliding scale: -250 = 2 units; 251-300 = 4 units; 301-350 = 6 units; 351-400 = 8 units; 401-450 = 10 units; if > 400 - call MD    insulin glargine (LANTUS) 70 Units, SubCUTAneous, NIGHTLY    lisinopril (PRINIVIL;ZESTRIL) 40 mg, Oral, DAILY    metFORMIN (GLUCOPHAGE) 1,000 mg, Oral, 2 TIMES DAILY WITH MEALS    pioglitazone (ACTOS) 15 mg, Oral, DAILY    polyethylene glycol 
Notified Dr. Danielson of BP now 180/91 via [employer].  
Occupational Therapy  Daily Treatment Note  Patient Name: Alfredo Rosenberg  MRN: [id number]    Assessment: Pt continues to be limited by baseline cognitive and CVA deficits. He is able to complete automatic tasks, such as feeding, but has significant difficulty following commands to participate in therapy. With Ax2 and much time and effort, pt sat EOB for >20 min, transferred to the chair via Geetha Stedy then returned to bed via leigh lift. Pt was limited in part by orthostatic hypotension, down to 80s/50s sitting in chair briefly. Per chart review, pt was fairly independent with basic mobility and self care at nursing facility prior to this injury. Recommend continued therapy at SNF as able.       Discharge Recommendations: SNF  Equipment Needs:  No  [facility]: 9    Chart Reviewed: Yes     Other position/activity restrictions: up with assist, WBAT   Additional Pertinent Hx: 68 y.o. male with pmh of CVA, dementia, diabetes, hypertension who presents with Fall at home, initial encounter with hip fracture. s/p RIGHT TOTAL HIP ARTHROPLASTY ANTERIOR APPROACH.    Diagnosis: (R) hip fx s/p (R) JOSE DANIEL anterior approach  Treatment Diagnosis: decreased functional skills / activities    Subjective: Pt in bed on entry feeding himself pudding. Cooperative overall but very limited by cognition. Mostly non-verbal, but occasionally speaks: \"Ouch\" \"Yeah\" \"Hold on\" and laughs/smiles at therapists.     Pain: Yes, says ouch, responds \"yes\" when asked if it hurts to move; RN aware; ice packs applied to R hip at end of session    Objective:    Cognition/Orientation: Impaired - previous CVA with aphasia and baseline dementia. Initiates automatic tasks such as feeding self, but does not follow verbal commands, sometimes responds to tactile cues/demonstration. As above, mostly non verbal but said a few words.     Bed mobility   Rolling: Dependent x2  Supine to sit: Dependent x2  Sit to Supine: Dependent x2  Sitting: varies CGA to mod assist for R 
Occupational Therapy  Facility/Department: [facility] 5T ORTHO/NEURO  Occupational Therapy Initial Assessment an Treatment     Name: Alfredo Rosenberg  : 1955  MRN: [id number]  Date of Service: 2024    Discharge Recommendations:  Long Term Care with OT, Subacute/Skilled Nursing Facility (Pt is a long-term resident at [facility])          Patient Diagnosis(es): The encounter diagnosis was Closed fracture of neck of right femur, initial encounter ([facility]).  Past Medical History:  has a past medical history of Anxiety, Arthritis, CAD (coronary artery disease), Cancer ([facility]), Depression, Dermatitis, Diabetes mellitus ([facility]), Diabetic neuropathy ([facility]), GERD (gastroesophageal reflux disease), Giardia, Hemorrhoids, Hyperlipidemia, Hypertension, and Migraine.  Past Surgical History:  has a past surgical history that includes Bladder surgery; Knee arthroscopy (Right); eye surgery; arthrodesis (Left, 14); and Total hip arthroplasty (Right, 1/3/2024).    Treatment Diagnosis: decreased functional skills / activities      Assessment   Performance deficits / Impairments: Decreased functional mobility ;Decreased endurance;Decreased ADL status;Decreased cognition  Assessment: Pt is a long-term resident at [facility], per facility staff he was supervision / (I) with transfers and ambulation and performed \"most ADLs\" (I)ly, needs assist with shower and all IADLs. Currently, pt is max assist with self-feeding, dependent for grooming and dependent x 2 for supine <> sit at EOB, would not participate / cooperate with sit <> stand or transfers. Pt could benefit from continued occupational therapy to maximize functional potential with ADLs and ADL mobility and transfers after (R) JOSE DANIEL.  Treatment Diagnosis: decreased functional skills / activities  Prognosis: Fair  Decision Making: High Complexity  REQUIRES OT FOLLOW-UP: Yes  Activity Tolerance  Activity Tolerance: Treatment limited secondary to decreased cognition;Treatment limited 
Ortho  Given young age, lifespan projection and mobility - Pt indicated hor JOSE DANIEL rather than [facility]  For that reason, my partner Dr Fernandez Bruner will assume care given his expertise in JOSE DANIEL  Plan for JOSE DANIEL Tues or Wed -TBA  Need for consent from POA  Please call with concerns     KYLE Ritchie MD   
Orthopaedic Surgery Progress Note:    Alfredo Rosenberg is a 68 y.o. male now POD # 1 s/p R DAA JOSE DANIEL for FNFx    S:   Pt seen and examined  Nonverbal  Will not respond to any cues  BG very poorly controlled      O:   Vitals:    01/04/24 1600   BP: 128/78   Pulse: 86   Resp: 18   Temp: 98.2 °F (36.8 °C)   SpO2: 93%       GEN: Alert, resting, NAD    RLE:  - dressing clean, dry, intact  - leg lengths equal  - 1+ DP pulse  - unable to assess NV status     Labs:    Lab Results   Component Value Date    WBC 15.8 (H) 01/03/2024    HGB 14.1 01/03/2024    HCT 42.0 01/03/2024    MCV 87.8 01/03/2024     01/03/2024       Lab Results   Component Value Date     01/03/2024    K 3.6 01/03/2024    CO2 25 01/03/2024     01/03/2024    BUN 28 (H) 01/03/2024    CREATININE 0.8 01/03/2024           Intake/Output Summary (Last 24 hours) at 1/4/2024 1716  Last data filed at 1/4/2024 1707  Gross per 24 hour   Intake 2299.32 ml   Output 700 ml   Net 1599.32 ml       Drains:  none    Cultures:  none    Imaging:  PO XR:  - s/p R DAA JOSE DANIEL    A: Alfredo Rosenberg is a 68 y.o. male now POD # 1 s/p R DAA JOSE DANIEL.      P:       Operative Note        Patient: Alfredo Rosenberg  YOB: 1955  MRN: [id number]     Date of Procedure: 1/3/2024     Pre-Op Diagnosis Codes:     * Closed fracture of right hip, initial encounter ([facility]) [S72.001A]     Post-Op Diagnosis: Post-Op Diagnosis Codes:     * Closed fracture of right hip, initial encounter ([facility]) [S72.001A]       Procedure(s):  RIGHT TOTAL HIP ARTHROPLASTY ANTERIOR APPROACH     Surgeon(s):  Fernandez Bruner MD     Assistant:   Surgical Assistant: Natty Rodriguez Michael     Anesthesia: General     Estimated Blood Loss (mL): 200      Complications: None     Specimens:   * No specimens in log *     Implants:  Dotson [employer] Nephew 54 mm R3 cup  Dotson [employer] Nephew reflection screw, 6.5 mm x 30 mm  Smith [employer] Nephew reflection screw, 6.5 mm x 35 mm  Smith [employer] Nephew 54 mm x 42 mm OR3O 
Orthopaedic Surgery Progress Note:    Alfredo Rosenberg is a 68 y.o. male with acute complete displaced R femoral neck fracture.    S:   Pt seen and examined  Unable to take hx  Patient does not respond to commands      O:   Vitals:    01/03/24 1610   BP:    Pulse: 67   Resp: 18   Temp: 97.8 °F (36.6 °C)   SpO2: 95%       GEN: Alert, resting in bed, NAD  HEENT: NCAT  CV: normotensive  RESP: non-labored breathing  GI:  obese  Right LE:   - slightly short, slightly externally rotated  - 2+ DP pulse  - unable to assess motor or sensation given his baseline dementia and hx of CVA    Labs:    Lab Results   Component Value Date    WBC 15.8 (H) 01/03/2024    HGB 14.1 01/03/2024    HCT 42.0 01/03/2024    MCV 87.8 01/03/2024     01/03/2024       Lab Results   Component Value Date     01/03/2024    K 3.6 01/03/2024    CO2 25 01/03/2024     01/03/2024    BUN 28 (H) 01/03/2024    CREATININE 0.8 01/03/2024           Intake/Output Summary (Last 24 hours) at 1/3/2024 1634  Last data filed at 1/3/2024 1556  Gross per 24 hour   Intake 315 ml   Output 2625 ml   Net -2310 ml       Drains:  none    Cultures:  none    Imaging:  XR AP pelvis:  - acute complete displaced right femoral neck fracture    A: Alfredo Rosenberg is a 68 y.o. male w right hip fracture.    P:     - OR today for R DAA JOSE DANIEL  - informed consent obtained from \[facility]\"" Eloise Bruner MD  [facility]  [address] [place] [address]  [address] [place] [address]  O: [phone]  C: [phone]          
PACU Transfer to Floor Note  #[id number]    Procedure(s):  RIGHT TOTAL HIP ARTHROPLASTY ANTERIOR APPROACH    DR Bruner    Current Allergies: Codeine    Pt meets criteria as per Yoandy Score and ASPAN Standards to transfer to next phase of care.     Recent Labs     01/03/24  1105 01/03/24 1924   POCGLU 297* 254*       Vitals:    01/03/24 2015   BP: (!) 154/90   Pulse: 69   Resp: 15   Temp: 98.1 °F (36.7 °C)   SpO2: 96%     Vitals within 20% of pt's admission vitals as per YOANDY SCORE    SpO2: 96 %    O2 Flow Rate (L/min): 1 L/min      Intake/Output Summary (Last 24 hours) at 1/3/2024 2030  Last data filed at 1/3/2024 1921  Gross per 24 hour   Intake 1695 ml   Output 2025 ml   Net -330 ml       Pain assessment:  none    Patient was assessed for alterations to skin integrity. There were not alterations observed.    Is patient incontinent: no has condom cath to drain bag    Handoff report given by phone to Blessed ALEXANDRA   Transported by house transport back to the floor      1/3/2024 8:30 PM  
Patient alert to self, VSS, Patient is non verbal, patient is voiding adequately per Exterm. Cath. Patient is tolerating a reg adult diet, patient is on bed rest at this time, patient takes his meds whole with water, patient has all standard fall precautions in place, call light and tray table are in reach.    
Patient is alert to self, patient is non verbal, patients VSS, patient is wearing a condom cath. Patient is tolerating a reg diet patient is achs, patient is voiding adequately per condom cath,, patient has all standard fall precautions in place, call light and tray table are in reach, patient did remove his IV, RN did chart it and passed on in shift report.   
Patient is alert x1 patient is non verbal, obeys commands, only response was YES, patient is wearing a condom cath. And voiding, patient is tolerating a reg adult diet and swallows pills whole with water, patient is maxi lift for assist, patient has all standard fall precautions in place, call light and tray table are in reach.  
Patient is non verbal, patient is alert and orient to self, patient is voiding by a condom cath, patient is tolerating a reg diet, patient is a achs, patient is a maxi lift for transfers, patient will be discharging to [facility] for rehab services, patient has all standard fall precautions in place call light and tray are in reach.  
Per case management pt is from [facility] and most recent pt contact found was Sil Mitchell but had different phone numbers than in chart ([phone] and [phone]).  This RN called both phone numbers but no answer.  Then called [facility] and gave call-back number for which they said they would call when someone was available.       
Pharmacist Review and Automatic Dose Adjustment of Prophylactic Enoxaparin    The reviewing pharmacist has made an adjustment to the ordered enoxaparin dose or converted to UFH per the approved [facility] protocol and table as defined below.    Plan / Rationale: Based upon the patient's weight and renal function, the ordered dose of enoxaparin 40 mg subQ daily has been converted to 30 mg subQ BID.    Please call with questions--  Mi Stapleton, PharmD, [facility]  Wireless: [phone]   12/31/2023 8:06 AM        Alfredo Rosenberg is a 68 y.o. male.     Recent Labs     12/30/23  1854 12/31/23  0605   CREATININE 0.9 0.9       Estimated Creatinine Clearance: 111 mL/min (based on SCr of 0.9 mg/dL).    Recent Labs     12/30/23  1854 12/31/23  0605   HGB 13.5 13.9   HCT 40.5 41.1    207     Recent Labs     12/30/23  1854   INR 1.24*       Height:   Ht Readings from Last 1 Encounters:   12/31/23 1.88 m (6' 2.02\")     Weight:  Wt Readings from Last 1 Encounters:   12/31/23 118.7 kg (261 lb 11 oz)             
Physical Therapy  Facility/Department: [facility] 5T ORTHO/NEURO  Daily Treatment Note  NAME: Alfredo Rosenberg  : 1955  MRN: [id number]    Date of Service: 2024    Discharge Recommendations:  Long Term Care with PT, Subacute/Skilled Nursing Facility   PT Equipment Recommendations  Equipment Needed: No (defer)    Patient Diagnosis(es): The encounter diagnosis was Closed fracture of neck of right femur, initial encounter (Formerly [facility]).    Assessment   Assessment: Pt demonstrated improved overall participation & activity tolerance today. Pt continues to function well below his baseline, requiring heavy assistance of 2 people for bed mobility & transfers, as well as use of lift equipment. Recommend further inpt PT upon D/C. Will follow per plan of care.  Activity Tolerance: Patient tolerated treatment well;Treatment limited secondary to decreased cognition;Patient limited by pain  Equipment Needed: No (defer)     Plan    Physical Therapy Plan  General Plan: 5-7 times per week  Current Treatment Recommendations: Strengthening;Balance training;Functional mobility training;Transfer training;Gait training;Endurance training;Safety education & training;Therapeutic activities     Restrictions  Position Activity Restriction  Other position/activity restrictions: up with assist, WBAT     Subjective    Subjective  Subjective: Pt supine in bed & willing to participate. Pt essentially non-verbal. Answers yes/no to some questions. Laughs when PT jokes with him.  Pain: pt does not answer when asked if he has pain. says \"ouch!\" when right LE is moved.     Objective      Bed Mobility Training  Bed Mobility Training: Yes  Rolling: Total assistance;Assist X2  Supine to Sit: Total assistance;Assist X2  Sit to Supine: Total assistance;Assist X2  Scooting: Total assistance;Assist X2  Balance  Sitting: Impaired  Sitting - Static:  (varied from CGA to mod A at EOB. right lateral lean.)  Standing: Impaired  Standing - Static:  (pt stood briefly 
Physical Therapy  Facility/Department: [facility] 5T ORTHO/NEURO  Physical Therapy Initial Assessment/Treatment    Name: Alfredo Rosenberg  : 1955  MRN: [id number]  Date of Service: 2024    Discharge Recommendations:  Other (comment), Continue to assess pending progress (return to facility with increased assist)   PT Equipment Recommendations  Equipment Needed: No (defer)      Patient Diagnosis(es): The encounter diagnosis was Closed fracture of neck of right femur, initial encounter ([facility]).  Past Medical History:  has a past medical history of Anxiety, Arthritis, CAD (coronary artery disease), Cancer ([facility]), Depression, Dermatitis, Diabetes mellitus ([facility]), Diabetic neuropathy ([facility]), GERD (gastroesophageal reflux disease), Giardia, Hemorrhoids, Hyperlipidemia, Hypertension, and Migraine.  Past Surgical History:  has a past surgical history that includes Bladder surgery; Knee arthroscopy (Right); eye surgery; arthrodesis (Left, 14); and Total hip arthroplasty (Right, 1/3/2024).    Assessment   Body Structures, Functions, Activity Limitations Requiring Skilled Therapeutic Intervention: Decreased functional mobility ;Decreased strength;Decreased cognition  Assessment: 68 y.o. male with pmh of CVA, dementia, diabetes, hypertension who presents with Fall at home, initial encounter with hip fracture. s/p RIGHT TOTAL HIP ARTHROPLASTY ANTERIOR APPROACH. Pt currently requiring Dep Ax2 for bed mobility. Pt unable/unwilling to attempt sit<>stand transfers. Pt limited by impaired cognition. Per facility pt comes from he is typically able to transfer and amb with supervision and without AD. Pt unable to stand with max Ax2 2/2 to no pt assist/attempt to participate in stand. Pt is well below his baseline and would benefit from further skilled PT to maximize safety and independence with functional mobility. Will continue to assess.  Treatment Diagnosis: Decreased functional mobility  Therapy Prognosis: 
Preop checklist and surgery checklist complete.    
Pt admitted for ED to unit. Pt Non-verbal at baseline, cannot follow commands. Will answer all posed questions with yes. 1 PIV in place, 4 eyes complete. Pt currently resting comfortably.  
Pt alert but unable to assess orientation due to pt being nonverbal.  Pt follows some simple commands but not all.  Pt does not exhibit any s/s of pain to R hip.  No acute events noted this shift.      Pt remains NPO per hospitalist.  Voiding adequately via external cath.      Still unable to get in contact with any  or [place] regarding pt information.      Pt is currently in bed with all fall and safety precautions in place, bed locked and in lowest position, call light and belongings within reach.  Pt denies further needs at this time.  
Pt alert but unable to assess orientation. Pt manifest as non-verbal at baseline. Will follow simple commands but not all.   VSS RA with exception to elevated BP.   Able to take pills whole with water, has had very little interest in food this evening.    Using FLACC scale to assess pain, do not exhibit s/s of pain.  Voiding via condom cath.  PT transferred to speciality mattress.   All standard fall and safety precautions remain in place. Plan of care continues.  
Pt alert but unable to assess orientations status due to pt being nonverbal at baseline.  VSS.  Pt does not exhibit any s/s of pain this far this shift.  No acute events noted.  Plan for surgery today, 2nd IV access was able to be obtained.      Pt remains NPO for procedure but is voiding adequately via external cath.    Pt is currently resting in bed with all fall and safety precautions maintained, bed locked and in lowest position, call light and belongings within reach.  Pt denies further needs at this time.  
Pt is Alert and nonverbal. VSS, RA. pain is being managed with pain medicine per MAR. Incision site CDI. Voiding adequately with incontinent brief, incontinent care provided as needed.   All fall precaution is in place. Call light is within the reach.   
Pt nonverbal, VSS, condom cath found on floor after being pulled off, IV removed at beginning of shift by patient.   New external catheter applied,   Pt has yet to void this shift  Bladder scanned for 237mL  
Pt out of room to OR  
Pt. Alert but non-verbal and resting on his bed. At the time of assessment patient doesn't show any signs of pain or discomfort. VSS on room air. In 4 carb choice diet and tolerating it well. Able to feed himself. Pt. was pulling IV lines repeatedly, so camera  was placed on his room.Dressing to the right hip looks CDI. Standard safety precautions in place and call light within reach.  
RIGHT TOTAL HIP ARTHROPLASTY ANTERIOR APPROACH   Dr Bruner    Call to 5 tower to give report to  RN phone not working [facility] to give message   Awaiting call back    
RIGHT TOTAL HIP ARTHROPLASTY ANTERIOR APPROACH   Dr Bruner    Current Allergies: Codeine    Recent Labs     01/03/24  1105 01/03/24  1924   POCGLU 297* 254*       Admitted to PACU bed 7 from OR. Arrived on a specialty bed.  Patient to be transferred back to his room # 7371.      Attached to PACU monitoring system. Alarms and parameters set.   Report received from anesthesia personnel. Antonio TERRELL  OR staff did not report skin issues that were observed while in OR, or if admitted with skin issue.  No problems reported intraoperatively.  Pt arrived with oxygen per  simple mask  with oxygen at 5 liters. Oral airway in place doesn't want to give up open eyes and looks at you    Surgical site to the right hip.  Closed with silver dressing and ice applied  Brisk cap refill, pulses palpable warm to touch.    Doctors aware of all labs and diagnostics before coming to recovery.      
RIGHT TOTAL HIP ARTHROPLASTY ANTERIOR APPROACH   Dr Bruner    X--ray is at bedside for film    
Reached out to CM for assistance in finding out which nursing facility pt came from in order to gather information necessary for plan of care.  
Specialty mattress ordered for pt.  
Spoke with Dr. Bruner regarding OR plan. Plan is for OR tomorrow morning. RN caring for pt updated. Phone consent obtained with POA. POA's phone number passed along to surgeon to reach out.      
Still unable to complete the admission documentation d/t pt being non-verbal and unable to get in touch with family.   
This RN attempted to call pt contact Sil Mitchell ([phone] number disconnected and [phone] number no answer) in order to complete admission and review home medications.  Unable to reach contact.  No information in pt hard or electronic chart regarding which nursing facility that pt came from to gain information either.    
To OR via bed per OR RN and MELIDA Escalante CRNA.  Dr. Bruner came by bed and marked the site.  Dr. Burnette called pt's legal guardian Eloise Fish and anesthesia consent obtained with 2 RN witnesses, one being myself.    
Tried calling multiple times to give report to the number provided, phone was answered but they kept in a hold and never got back to this RN.  
VSS on room air. Pt is alert. [facility] orientation. Pt does not respond appropriately. Pt only responds \"yea\" and \"ouch\". Voiding via external catheter. Tolerating PO diet/fluids, no N/V witnessed. Pt is able to feed self, just needs set up help. Frequent repositioning provided with pillow support and specialty mattress for comfort and pressure relief. All fall precautions in place.  
  Vitals:    12/31/23 0730 12/31/23 0902 12/31/23 1117 12/31/23 1442   BP:  (!) 171/89 (!) 180/91 (!) 166/89   Pulse:  94 88 78   Resp:   16 16   Temp:   97.9 °F (36.6 °C) 97.2 °F (36.2 °C)   TempSrc:   Temporal Temporal   SpO2:   99% 98%   Weight: 118.7 kg (261 lb 11 oz)      Height: 1.88 m (6' 2.02\")            Physical Exam:      General: NAD  Eyes: EOMI  ENT: neck supple  Cardiovascular: Regular rate.  Respiratory: Clear to auscultation  Gastrointestinal: Soft, non tender  Genitourinary: no suprapubic tenderness  Musculoskeletal: No edema  Skin: warm, dry  Neuro: Alert.        Medications:   Medications:    enoxaparin  30 mg SubCUTAneous BID    amLODIPine  10 mg Oral Daily    atorvastatin  40 mg Oral Daily    carvedilol  25 mg Oral BID    DULoxetine  60 mg Oral Daily    insulin glargine  10 Units SubCUTAneous Nightly    lisinopril  40 mg Oral Daily    pantoprazole  40 mg Oral QAM AC    insulin lispro  0-4 Units SubCUTAneous TID WC    insulin lispro  0-4 Units SubCUTAneous Nightly    sodium chloride flush  5-40 mL IntraVENous 2 times per day      Infusions:    dextrose      sodium chloride       PRN Meds: glucose, 4 tablet, PRN  dextrose bolus, 125 mL, PRN   Or  dextrose bolus, 250 mL, PRN  glucagon (rDNA), 1 mg, PRN  dextrose, , Continuous PRN  sodium chloride flush, 5-40 mL, PRN  sodium chloride, , PRN  potassium chloride, 40 mEq, PRN   Or  potassium alternative oral replacement, 40 mEq, PRN   Or  potassium chloride, 10 mEq, PRN  magnesium sulfate, 2,000 mg, PRN  ondansetron, 4 mg, Q8H PRN   Or  ondansetron, 4 mg, Q6H PRN  polyethylene glycol, 17 g, Daily PRN  acetaminophen, 650 mg, Q6H PRN   Or  acetaminophen, 650 mg, Q6H PRN        Labs and Imaging   XR KNEE LEFT (1-2 VIEWS)    Result Date: 12/30/2023  Exam: Left Knee, 2 views History: 68 years Male; \"fall\". Comparison: None available Findings: No acute fracture or dislocation. No osseous erosion. No joint effusion. No significant soft tissue abnormality. No 
No significant soft tissue abnormality. No radiopaque foreign body.     Impression: No acute osseous injury. Electronically signed by Garcia Newton MD    XR ELBOW RIGHT (2 VIEWS)    Result Date: 12/30/2023  Exam: Right Elbow, 2 views History: 68 years Male; \"fall\". Comparison: None available Findings: No acute fracture or dislocation. Degenerative changes of the elbow joint. Evaluation for effusion is limited due to obliquity on lateral view. No significant soft tissue abnormality. No radiopaque foreign body.     Impression: No acute osseous injury. Electronically signed by Garcia Newton MD    XR KNEE RIGHT (1-2 VIEWS)    Result Date: 12/30/2023  XR KNEE RIGHT (1-2 VIEWS) History: 68 years Male; \"fall\". Comparison: None available Findings: Fixation hardware of the proximal medial tibia. No acute fracture or dislocation. No osseous erosion. Tricompartmental osteoarthrosis in the form of joint space narrowing and marginal osteophytosis, most advanced in the medial compartment. No large joint  effusion. No significant soft tissue abnormality.     Impression: No acute osseous injury. Electronically signed by Garcia Newton MD    XR HIP BILATERAL W AP PELVIS (2 VIEWS)    Result Date: 12/30/2023  Exam: XR HIP BILATERAL W AP PELVIS (2 VIEWS), XR FEMUR RIGHT (MIN 2 VIEWS), XR FEMUR LEFT (MIN 2 VIEWS) History: 68 years Male; \"fall, pain\". Comparison: None available Findings: Displaced transcervical fracture of the right femoral neck. No dislocation. Remaining osseous structures are intact. No radiopaque foreign body.     Impression: Displaced transcervical right femoral neck fracture. Electronically signed by Garcia Newton MD    XR FEMUR LEFT (MIN 2 VIEWS)    Result Date: 12/30/2023  Exam: XR HIP BILATERAL W AP PELVIS (2 VIEWS), XR FEMUR RIGHT (MIN 2 VIEWS), XR FEMUR LEFT (MIN 2 VIEWS) History: 68 years Male; \"fall, pain\". Comparison: None available Findings: Displaced transcervical fracture of the right femoral neck. No dislocation. 
Electronically signed by Garcia Newton MD    XR HIP BILATERAL W AP PELVIS (2 VIEWS)    Result Date: 12/30/2023  Exam: XR HIP BILATERAL W AP PELVIS (2 VIEWS), XR FEMUR RIGHT (MIN 2 VIEWS), XR FEMUR LEFT (MIN 2 VIEWS) History: 68 years Male; \"fall, pain\". Comparison: None available Findings: Displaced transcervical fracture of the right femoral neck. No dislocation. Remaining osseous structures are intact. No radiopaque foreign body.     Impression: Displaced transcervical right femoral neck fracture. Electronically signed by Garcia Newton MD    XR FEMUR LEFT (MIN 2 VIEWS)    Result Date: 12/30/2023  Exam: XR HIP BILATERAL W AP PELVIS (2 VIEWS), XR FEMUR RIGHT (MIN 2 VIEWS), XR FEMUR LEFT (MIN 2 VIEWS) History: 68 years Male; \"fall, pain\". Comparison: None available Findings: Displaced transcervical fracture of the right femoral neck. No dislocation. Remaining osseous structures are intact. No radiopaque foreign body.     Impression: Displaced transcervical right femoral neck fracture. Electronically signed by Garcia Newton MD    XR FEMUR RIGHT (MIN 2 VIEWS)    Result Date: 12/30/2023  Exam: XR HIP BILATERAL W AP PELVIS (2 VIEWS), XR FEMUR RIGHT (MIN 2 VIEWS), XR FEMUR LEFT (MIN 2 VIEWS) History: 68 years Male; \"fall, pain\". Comparison: None available Findings: Displaced transcervical fracture of the right femoral neck. No dislocation. Remaining osseous structures are intact. No radiopaque foreign body.     Impression: Displaced transcervical right femoral neck fracture. Electronically signed by Garcia Newton MD    CT HEAD WO CONTRAST    Result Date: 12/30/2023  CT HEAD WO CONTRAST HISTORY: 68 years Male; \"fall\" COMPARISON: CT brain 1/11/2023 TECHNIQUE: CT brain without contrast per standard protocol. Multiplanar reformatted images were reviewed. Up-to-date CT equipment and radiation dose reduction techniques were employed. FINDINGS: No acute intracranial hemorrhage, mass effect, midline shift, or hydrocephalus. Gray-white matter 
available Findings: Fixation hardware of the proximal medial tibia. No acute fracture or dislocation. No osseous erosion. Tricompartmental osteoarthrosis in the form of joint space narrowing and marginal osteophytosis, most advanced in the medial compartment. No large joint  effusion. No significant soft tissue abnormality.     Impression: No acute osseous injury. Electronically signed by Garcia Newton MD    XR HIP BILATERAL W AP PELVIS (2 VIEWS)    Result Date: 12/30/2023  Exam: XR HIP BILATERAL W AP PELVIS (2 VIEWS), XR FEMUR RIGHT (MIN 2 VIEWS), XR FEMUR LEFT (MIN 2 VIEWS) History: 68 years Male; \"fall, pain\". Comparison: None available Findings: Displaced transcervical fracture of the right femoral neck. No dislocation. Remaining osseous structures are intact. No radiopaque foreign body.     Impression: Displaced transcervical right femoral neck fracture. Electronically signed by Garcia Newton MD    XR FEMUR LEFT (MIN 2 VIEWS)    Result Date: 12/30/2023  Exam: XR HIP BILATERAL W AP PELVIS (2 VIEWS), XR FEMUR RIGHT (MIN 2 VIEWS), XR FEMUR LEFT (MIN 2 VIEWS) History: 68 years Male; \"fall, pain\". Comparison: None available Findings: Displaced transcervical fracture of the right femoral neck. No dislocation. Remaining osseous structures are intact. No radiopaque foreign body.     Impression: Displaced transcervical right femoral neck fracture. Electronically signed by Garcia Newton MD    XR FEMUR RIGHT (MIN 2 VIEWS)    Result Date: 12/30/2023  Exam: XR HIP BILATERAL W AP PELVIS (2 VIEWS), XR FEMUR RIGHT (MIN 2 VIEWS), XR FEMUR LEFT (MIN 2 VIEWS) History: 68 years Male; \"fall, pain\". Comparison: None available Findings: Displaced transcervical fracture of the right femoral neck. No dislocation. Remaining osseous structures are intact. No radiopaque foreign body.     Impression: Displaced transcervical right femoral neck fracture. Electronically signed by Garcia Newton MD    CT HEAD WO CONTRAST    Result Date: 12/30/2023  CT HEAD WO 
signed by Garcia Newton MD    XR HIP BILATERAL W AP PELVIS (2 VIEWS)    Result Date: 12/30/2023  Exam: XR HIP BILATERAL W AP PELVIS (2 VIEWS), XR FEMUR RIGHT (MIN 2 VIEWS), XR FEMUR LEFT (MIN 2 VIEWS) History: 68 years Male; \"fall, pain\". Comparison: None available Findings: Displaced transcervical fracture of the right femoral neck. No dislocation. Remaining osseous structures are intact. No radiopaque foreign body.     Impression: Displaced transcervical right femoral neck fracture. Electronically signed by Garcia Newton MD    XR FEMUR LEFT (MIN 2 VIEWS)    Result Date: 12/30/2023  Exam: XR HIP BILATERAL W AP PELVIS (2 VIEWS), XR FEMUR RIGHT (MIN 2 VIEWS), XR FEMUR LEFT (MIN 2 VIEWS) History: 68 years Male; \"fall, pain\". Comparison: None available Findings: Displaced transcervical fracture of the right femoral neck. No dislocation. Remaining osseous structures are intact. No radiopaque foreign body.     Impression: Displaced transcervical right femoral neck fracture. Electronically signed by Garcia Newton MD    XR FEMUR RIGHT (MIN 2 VIEWS)    Result Date: 12/30/2023  Exam: XR HIP BILATERAL W AP PELVIS (2 VIEWS), XR FEMUR RIGHT (MIN 2 VIEWS), XR FEMUR LEFT (MIN 2 VIEWS) History: 68 years Male; \"fall, pain\". Comparison: None available Findings: Displaced transcervical fracture of the right femoral neck. No dislocation. Remaining osseous structures are intact. No radiopaque foreign body.     Impression: Displaced transcervical right femoral neck fracture. Electronically signed by Garcia Newton MD    CT HEAD WO CONTRAST    Result Date: 12/30/2023  CT HEAD WO CONTRAST HISTORY: 68 years Male; \"fall\" COMPARISON: CT brain 1/11/2023 TECHNIQUE: CT brain without contrast per standard protocol. Multiplanar reformatted images were reviewed. Up-to-date CT equipment and radiation dose reduction techniques were employed. FINDINGS: No acute intracranial hemorrhage, mass effect, midline shift, or hydrocephalus. Gray-white matter differentiation is

## 2024-01-08 NOTE — PLAN OF CARE
Problem: Discharge Planning  Goal: Discharge to home or other facility with appropriate resources  1/8/2024 1432 by Mona Vargas RN  Outcome: Adequate for Discharge  1/8/2024 1432 by Mona Vargas RN  Outcome: Progressing  1/8/2024 0959 by Mona Vargas RN  Outcome: Progressing     Problem: Safety - Adult  Goal: Free from fall injury  1/8/2024 1432 by Mona Vargas RN  Outcome: Adequate for Discharge  1/8/2024 1432 by Mona Vargas RN  Outcome: Progressing  1/8/2024 0959 by Mona Vargas RN  Outcome: Progressing     Problem: Skin/Tissue Integrity  Goal: Absence of new skin breakdown  Description: 1.  Monitor for areas of redness and/or skin breakdown  2.  Assess vascular access sites hourly  3.  Every 4-6 hours minimum:  Change oxygen saturation probe site  4.  Every 4-6 hours:  If on nasal continuous positive airway pressure, respiratory therapy assess nares and determine need for appliance change or resting period.  1/8/2024 1432 by Mona Vargas RN  Outcome: Adequate for Discharge  1/8/2024 1432 by Mona Vargas RN  Outcome: Progressing  1/8/2024 0959 by Mona Vargas RN  Outcome: Progressing     Problem: ABCDS Injury Assessment  Goal: Absence of physical injury  1/8/2024 1432 by Mona Vargas RN  Outcome: Adequate for Discharge  1/8/2024 1432 by Mona Vargas RN  Outcome: Progressing  1/8/2024 0959 by Mona Vargas RN  Outcome: Progressing     Problem: Gastrointestinal - Adult  Goal: Minimal or absence of nausea and vomiting  1/8/2024 1432 by Mona Vargas RN  Outcome: Adequate for Discharge  1/8/2024 0959 by Mona Vargas RN  Outcome: Progressing  Goal: Maintains or returns to baseline bowel function  1/8/2024 1432 by Mona Vargas RN  Outcome: Adequate for Discharge  1/8/2024 0959 by Mona Vargas RN  Outcome: Progressing  Goal: Maintains adequate nutritional intake  1/8/2024 1432 by Mona Vargas RN  Outcome: Adequate for Discharge  1/8/2024 0959 by Mona Vargas,

## 2024-01-08 NOTE — PLAN OF CARE
Problem: Discharge Planning  Goal: Discharge to home or other facility with appropriate resources  1/8/2024 0959 by Mona Vargas RN  Outcome: Progressing  1/7/2024 2317 by Kenya León RN  Outcome: Progressing  Flowsheets  Taken 1/7/2024 1600 by Aby King RN  Discharge to home or other facility with appropriate resources: Identify barriers to discharge with patient and caregiver  Taken 1/7/2024 1200 by Aby King RN  Discharge to home or other facility with appropriate resources: Identify barriers to discharge with patient and caregiver     Problem: Pain  Goal: Verbalizes/displays adequate comfort level or baseline comfort level  1/8/2024 0959 by Mona Vargas RN  Outcome: Progressing  1/7/2024 2317 by Kenya León RN  Outcome: Progressing     Problem: Safety - Adult  Goal: Free from fall injury  1/8/2024 0959 by Mona Vargas RN  Outcome: Progressing  1/7/2024 2317 by Kenya León RN  Outcome: Progressing     Problem: Skin/Tissue Integrity  Goal: Absence of new skin breakdown  Description: 1.  Monitor for areas of redness and/or skin breakdown  2.  Assess vascular access sites hourly  3.  Every 4-6 hours minimum:  Change oxygen saturation probe site  4.  Every 4-6 hours:  If on nasal continuous positive airway pressure, respiratory therapy assess nares and determine need for appliance change or resting period.  1/8/2024 0959 by Mona Vargas RN  Outcome: Progressing  1/7/2024 2317 by Kenya León RN  Outcome: Progressing     Problem: ABCDS Injury Assessment  Goal: Absence of physical injury  1/8/2024 0959 by Mona Vargas RN  Outcome: Progressing  1/7/2024 2317 by Kenya León RN  Outcome: Progressing     Problem: Gastrointestinal - Adult  Goal: Minimal or absence of nausea and vomiting  1/8/2024 0959 by Mona Vargas RN  Outcome: Progressing  1/7/2024 2317 by Kenya León RN  Outcome: Progressing  Goal: Maintains or returns to baseline bowel function  1/8/2024 0959

## 2024-01-08 NOTE — DISCHARGE SUMMARY
V2.0  Discharge Summary    Name:  Alfredo Rosenberg /Age/Sex: 1955 (68 y.o. male)   Admit Date: 2023  Discharge Date: 24    MRN & CSN:  [id number] & [id number] Encounter Date and Time 24 12:07 PM EST    Attending:  Theresa Olivera MD Discharging Provider: Theresa Olivera MD       Hospital Course:     Brief HPI: Alfredo Rosenberg is a 68 y.o. male with pmh of CVA, dementia, diabetes, hypertension who presents with Fall at home, initial encounter with hip fracture and underwent arthroplasty on 1/3/2024 blood sugars were poorly controlled.  His dose of Lantus has been increased at the time of discharge.  Patient also has some retention of urine Flomax has been started.  Needs outpatient follow-up with orthopedic on 2024    Brief Problem Based Course:   Displaced right femoral neck fracture-s/p arthroplasty 1/3/2024  -Continue pain medications     Hypertension  -Monitor blood pressure and continue home medications(Coreg, amlodipine, lisinopril)  -As needed iv hydralazine for uncontrolled hypertension     Diabetes mellitus-better controlled  -Add a.m. Lantus to night dose of Lantus as patient is getting very high doses of Lantus and absorption will be poor.  Continue Premeal insulin  -Diet switch to carb control  -A1c 7.8     Retention of urine  -Bladder scan showed greater than 600 mL s/p straight cath.   -Add Flomax     Remote history of CVA with dementia and residual aphasia  -Continue home Cymbalta and statin      The patient expressed appropriate understanding of, and agreement with the discharge recommendations, medications, and plan.     Consults this admission:  IP CONSULT TO ORTHOPEDIC SURGERY  IP CONSULT TO CRITICAL CARE    Discharge Diagnosis:   Fall at home, initial encounter  Right femoral neck fracture  Controlled diabetes    Discharge Instruction:   Follow up appointments: Orthopedics in 2 to 3 weeks  Primary care physician: Wong Guzman MD within 2 weeks  Diet: diabetic

## 2024-01-08 NOTE — DISCHARGE INSTR - COC
Continuity of Care Form    Patient Name: Alfredo Rosenberg   :  1955  MRN:  [id number]    Admit date:  2023  Discharge date:  ***    Code Status Order: Full Code   Advance Directives:   Advance Care Flowsheet Documentation       Date/Time Healthcare Directive Type of Healthcare Directive Copy in Chart Healthcare Agent Appointed Healthcare Agent's Name Healthcare Agent's Phone Number    24 [phone] No, patient does not have an advance directive for healthcare treatment -- -- -- -- --            Admitting Physician:  Eduard Armando MD  PCP: Wong Guzman MD    Discharging Nurse: ***  Discharging Hospital Unit/Room#: 5522/5522-01  Discharging Unit Phone Number: ***    Emergency Contact:   Extended Emergency Contact Information  Primary Emergency Contact: Sil Mitchell  Address: [address] [place] [address]  Home Phone: [phone]  Mobile Phone: [phone]  Relation: Other  Secondary Emergency Contact: Eloise Fish  Mobile Phone: [phone]  Relation: Legal Guardian  Preferred language: English   needed? No    Past Surgical History:  Past Surgical History:   Procedure Laterality Date    ARTHRODESIS Left 14    Arthrodesis 4 th proximal interphalangeal joint left foot    BLADDER SURGERY      EYE SURGERY      right eye-skin cancer removed    KNEE ARTHROSCOPY Right     x4    TOTAL HIP ARTHROPLASTY Right 1/3/2024    RIGHT TOTAL HIP ARTHROPLASTY ANTERIOR APPROACH performed by Fernandez Bruner MD at [facility] OR       Immunization History:   Immunization History   Administered Date(s) Administered    COVID-19, PFIZER Bivalent, DO NOT Dilute, (age 12y+), IM, 30 mcg/0.3 mL 2023    COVID-19, PFIZER PURPLE top, DILUTE for use, (age 12 y+), 30mcg/0.3mL 2021, 2021, 10/22/2021, 2022, 2022, 2022       Active Problems:  Patient Active Problem List   Diagnosis Code    Curry M20.40    Diabetes mellitus type

## 2024-01-08 NOTE — CARE COORDINATION
Called patients POA via phone.  Was able to leave a message on VM.  Unable to speak to any one at this time.

## (undated) DEVICE — SYRINGE MED 50ML LUERLOCK TIP

## (undated) DEVICE — HOLDER SCALP PLAS G STD

## (undated) DEVICE — SUTURE STRATAFIX SYMMETRIC PDS + SZ 1 L18IN ABSRB VLT L48MM SXPP1A400

## (undated) DEVICE — SOLUTION IRRIG 3000ML 0.9% SOD CHL USP UROMATIC PLAS CONT

## (undated) DEVICE — SUTURE STRATAFIX SPRL SZ 3-0 L12IN ABSRB UD FS-1 L30X30CM SXMP2B410

## (undated) DEVICE — NEEDLE SUT SZ 3 MAYO 1 2 CIR TAPR PNT DISPOSABLE

## (undated) DEVICE — SUTURE VCRL SZ 2-0 L18IN ABSRB UD CT-1 L36MM 1/2 CIR J839D

## (undated) DEVICE — TOWEL,STOP FLAG GOLD N-W: Brand: MEDLINE

## (undated) DEVICE — GARMENT,MEDLINE,DVT,INT,CALF,MED, GEN2: Brand: MEDLINE

## (undated) DEVICE — ANTERIOR TOTAL HIP: Brand: MEDLINE INDUSTRIES, INC.

## (undated) DEVICE — ADHESIVE SKIN CLOSURE WND 8.661X1.5 IN 22 CM LIQUIBAND SECUR

## (undated) DEVICE — PROTECTOR ULN NRV PUR FOAM HK LOOP STRP ANATOMICALLY

## (undated) DEVICE — DRESSING WND ISLAND 4X8 IN ANTIMICROBIAL BARR THERABOND 3D

## (undated) DEVICE — PEEL-AWAY HOOD: Brand: FLYTE, SURGICOOL

## (undated) DEVICE — 3M™ STERI-DRAPE™ INSTRUMENT POUCH 1018: Brand: STERI-DRAPE™

## (undated) DEVICE — DUAL CUT SAGITTAL BLADE

## (undated) DEVICE — BLADE,CARBON-STEEL,15,STRL,DISPOSABLE,TB: Brand: MEDLINE

## (undated) DEVICE — SYRINGE, LUER LOCK, 60ML: Brand: MEDLINE

## (undated) DEVICE — UNDERGLOVE SURG SZ 8 BLU LTX FREE SYN POLYISOPRENE POLYMER

## (undated) DEVICE — SYRINGE MED 30ML STD CLR PLAS LUERLOCK TIP N CTRL DISP

## (undated) DEVICE — 6619 2 PTNT ISO SYS INCISE AREA&LT;(&GT;&&LT;)&GT;P: Brand: STERI-DRAPE™ IOBAN™ 2

## (undated) DEVICE — MARKER SURG SKIN UTIL BLK REG TIP NONSMEARING W/ 6IN RUL

## (undated) DEVICE — APPLICATOR MEDICATED 26 CC SOLUTION HI LT ORNG CHLORAPREP

## (undated) DEVICE — SYRINGE 20ML LL S/C 50

## (undated) DEVICE — GOWN,SIRUS,POLYRNF,BRTHSLV,XL,30/CS: Brand: MEDLINE

## (undated) DEVICE — DRESSING HYDROFIBER AQUACEL AG ADVANTAGE 3.5X10 IN

## (undated) DEVICE — GLOVE ORTHO 8   MSG9480

## (undated) DEVICE — BIPOLAR SEALER 23-112-1 AQM 6.0: Brand: AQUAMANTYS ®